# Patient Record
Sex: MALE | Race: WHITE | URBAN - METROPOLITAN AREA
[De-identification: names, ages, dates, MRNs, and addresses within clinical notes are randomized per-mention and may not be internally consistent; named-entity substitution may affect disease eponyms.]

---

## 2019-03-20 VITALS — HEIGHT: 75 IN | WEIGHT: 180 LBS | BODY MASS INDEX: 22.38 KG/M2

## 2019-03-20 DIAGNOSIS — I10 ESSENTIAL HYPERTENSION: ICD-10-CM

## 2019-03-20 DIAGNOSIS — E11.9 NON-INSULIN DEPENDENT TYPE 2 DIABETES MELLITUS (HCC): ICD-10-CM

## 2019-03-20 RX ORDER — SIMVASTATIN 20 MG
20 TABLET ORAL NIGHTLY
COMMUNITY

## 2019-03-20 RX ORDER — RAMIPRIL 10 MG/1
10 CAPSULE ORAL DAILY
COMMUNITY
End: 2022-05-17 | Stop reason: ALTCHOICE

## 2019-03-20 SDOH — HEALTH STABILITY: MENTAL HEALTH: HOW OFTEN DO YOU HAVE A DRINK CONTAINING ALCOHOL?: NEVER

## 2019-03-27 ENCOUNTER — TELEPHONE (OUTPATIENT)
Dept: GASTROENTEROLOGY | Age: 59
End: 2019-03-27

## 2019-03-27 ENCOUNTER — OFFICE VISIT (OUTPATIENT)
Dept: FAMILY MEDICINE CLINIC | Age: 59
End: 2019-03-27
Payer: COMMERCIAL

## 2019-03-27 VITALS
WEIGHT: 169.8 LBS | SYSTOLIC BLOOD PRESSURE: 134 MMHG | HEIGHT: 75 IN | DIASTOLIC BLOOD PRESSURE: 66 MMHG | BODY MASS INDEX: 21.11 KG/M2

## 2019-03-27 DIAGNOSIS — I10 ESSENTIAL HYPERTENSION: ICD-10-CM

## 2019-03-27 DIAGNOSIS — R53.83 FATIGUE, UNSPECIFIED TYPE: ICD-10-CM

## 2019-03-27 DIAGNOSIS — E11.9 TYPE 2 DIABETES MELLITUS WITHOUT COMPLICATION, UNSPECIFIED WHETHER LONG TERM INSULIN USE (HCC): ICD-10-CM

## 2019-03-27 DIAGNOSIS — Z86.19 HISTORY OF HEPATITIS: ICD-10-CM

## 2019-03-27 DIAGNOSIS — Z86.010 HISTORY OF COLON POLYPS: ICD-10-CM

## 2019-03-27 DIAGNOSIS — Z12.11 COLON CANCER SCREENING: Primary | ICD-10-CM

## 2019-03-27 DIAGNOSIS — Z00.00 ROUTINE GENERAL MEDICAL EXAMINATION AT A HEALTH CARE FACILITY: Primary | ICD-10-CM

## 2019-03-27 DIAGNOSIS — E78.5 HYPERLIPIDEMIA, UNSPECIFIED HYPERLIPIDEMIA TYPE: ICD-10-CM

## 2019-03-27 PROBLEM — Z86.0100 HISTORY OF COLON POLYPS: Status: ACTIVE | Noted: 2019-03-27

## 2019-03-27 PROCEDURE — G8484 FLU IMMUNIZE NO ADMIN: HCPCS | Performed by: FAMILY MEDICINE

## 2019-03-27 PROCEDURE — 99386 PREV VISIT NEW AGE 40-64: CPT | Performed by: FAMILY MEDICINE

## 2019-03-27 RX ORDER — CHLORHEXIDINE GLUCONATE 0.12 MG/ML
RINSE ORAL
Refills: 6 | COMMUNITY
Start: 2019-03-04

## 2019-03-27 RX ORDER — M-VIT,TX,IRON,MINS/CALC/FOLIC 27MG-0.4MG
1 TABLET ORAL DAILY
COMMUNITY

## 2019-03-27 ASSESSMENT — PATIENT HEALTH QUESTIONNAIRE - PHQ9
SUM OF ALL RESPONSES TO PHQ QUESTIONS 1-9: 0
2. FEELING DOWN, DEPRESSED OR HOPELESS: 0
SUM OF ALL RESPONSES TO PHQ9 QUESTIONS 1 & 2: 0
SUM OF ALL RESPONSES TO PHQ QUESTIONS 1-9: 0
1. LITTLE INTEREST OR PLEASURE IN DOING THINGS: 0

## 2019-03-27 NOTE — PROGRESS NOTES
tablet Take 20 mg by mouth nightly    Historical Provider, MD   ramipril (ALTACE) 10 MG capsule Take 10 mg by mouth daily    Historical Provider, MD       ROS:  General Constitutional: Denies chills. Denies fever. Denies headache. Denies lightheadedness. Admits decreased stamina over the last few months. Ophthalmologic: Denies blurred vision. ENT: Denies nasal congestion. Denies sore throat. Denies ear pain and pressure. Respiratory: Denies cough. Denies shortness of breath. Denies wheezing. Cardiovascular: Denies chest pain at rest. Denies irregular heartbeat. Denies palpitations. Gastrointestinal: Denies abdominal pain. Denies blood in the stool. Admits constipation, drinks prune juice and takes ex lax bowels move once daily and stools are type 2-3 on bristol scale. Denies diarrhea. Denies nausea. Denies vomiting. Genitourinary: Denies blood in the urine. Denies difficulty urinating. Denies frequent urination. Denies painful urination. Denies urinary incontinence. Musculoskeletal: Denies muscle aches. Denies painful joints. Denies swollen joints. Peripheral Vascular: Denies pain/cramping in legs after exertion. Skin: Denies dry skin. Denies itching. Denies rash. Neurologic: Denies falls. Denies dizziness. Denies fainting. Denies tingling/numbness. Psychiatric: Admits sleep disturbance trouble sleeping due to being a light sleeper and waking to urinate but is usually able fall back asleep quickly and wakes feeling pretty rested. . Denies anxiety. Denies depressed mood. Podiatric: Admits aching tenderness in great toes and on the balls of feet L>R gradually worsening over the last couple of months. Motrin is helpful. Past Surgical History:   Procedure Laterality Date    CHOLECYSTECTOMY, LAPAROSCOPIC  200    COLONOSCOPY  2014?     pre-cancerous polyp removed, was told to repeat in 5 years       Family History   Problem Relation Age of Onset    Cancer Mother 54        breast    High Blood Pressure Father     Emphysema Father         smoker    Cancer Sister 54        breast    Heart Disease Paternal Grandfather        Past Medical History:   Diagnosis Date    Congenital absence of gallbladder     diagnosed by [facility] in [facility] hypertension     Non-insulin dependent type 2 diabetes mellitus ([facility] [address]       Social History     Tobacco Use    Smoking status: Never Smoker    Smokeless tobacco: Never Used   Substance Use Topics    Alcohol use: Never     Frequency: Never      Current Outpatient Medications   Medication Sig Dispense Refill    chlorhexidine (PERIDEX) 0.12 % solution RINSE 15 MLS IN MOUTH TWICE DAILY FOR 30 SECONDS, THEN SPIT OUT.  6    Multiple Vitamins-Minerals (THERAPEUTIC MULTIVITAMIN-MINERALS) tablet Take 1 tablet by mouth daily      Na Sulfate-K Sulfate-Mg Sulf (SUPREP BOWEL PREP KIT) 17.5-3.13-1.6 GM/177ML SOLN Take as directed 2 Bottle 0    metFORMIN (GLUCOPHAGE) 500 MG tablet Take 500 mg by mouth 2 times daily (with meals)      simvastatin (ZOCOR) 20 MG tablet Take 20 mg by mouth nightly      ramipril (ALTACE) 10 MG capsule Take 10 mg by mouth daily       No current facility-administered medications for this visit. No Known Allergies    PHYSICAL EXAM:    /66   Ht 6' 3\" (1.905 m)   Wt 169 lb 12.8 oz (77 kg)   BMI 21.22 kg/m²   Wt Readings from Last 3 Encounters:   03/27/19 169 lb 12.8 oz (77 kg)   03/20/19 180 lb (81.6 kg)     BP Readings from Last 3 Encounters:   03/27/19 134/66       General Appearance: in no acute distress, well developed, well nourished. Eyes: pupils equal, round reactive to light and accommodation. Wearing glasses  Ears: normal canal and TM's. Nose: nares patent, no lesions. Oral Cavity: mucosa moist.  Throat: clear. Neck/Thyroid: neck supple, full range of motion, no cervical lymphadenopathy, no thyromegaly or carotid bruits. Skin: warm and dry. No suspicious lesions. Heart: regular rate and rhythm. No murmurs.  S1, S2 normal, no gallops. Lungs: clear to auscultation bilaterally. Abdomen: bowel sounds present, soft, nontender, nondistended, no masses or organomegaly. Musculoskeletal: normal, full range of motion in knees and hips, no swelling or tenderness. Extremities: no cyanosis or edema. Peripheral Pulses: 2+ throughout, symetric. Neurologic: nonfocal, motor strength normal upper and lower extremities, sensory exam intact. Psych: normal affect, speech fluent. Diabetic foot check: monofilament testing: normal, bilaterally                                       Vibratory testing: normal, bilaterally. : testicular atrophy bilaterally, no hernia. Rectal: prostate small 3 cm x 2 cm, no nodules, scant amount of stool in rectal vault. Podiatric: calcification and nodularity distal metatarsal R great toe. ASSESSMENT:   Diagnosis Orders   1. Routine general medical examination at a health care facility   DIABETES FOOT EXAM    CBC    Comprehensive Metabolic Panel    CRP,High Sensitivity    Hemoglobin A1C    Lipid Panel    T4    TSH without Reflex    Testosterone    Microalbumin, Ur    Hepatitis A Antibody, IgM    Hepatitis B Core Antibody, IgM    Hepatitis C Antibody   2. History of hepatitis     3. History of colon polyps     4. Fatigue, unspecified type     5. Type 2 diabetes mellitus without complication, unspecified whether long term insulin use ([facility] [address]     6. Essential hypertension     7. Hyperlipidemia, unspecified hyperlipidemia type         PLAN:  We discuss his hospitalization from years ago. It sounds like he had a coxsackie virus since he has not had recurrence since then. Not knowing whether this was active or certain of which type of hepatitis he had, I would like to re-test.     He lost weight after being diagnosed with diabetes and starting metformin but he states his weight has been stable over the last year. He has also had increased stress     I would like to get some labs, today, also.  I will call with these results. I will refer him for a repeat colonoscopy. I will see him back in 6 months. Orders Placed This Encounter   Procedures    CBC     Standing Status:   Future     Standing Expiration Date:   3/27/2020    Comprehensive Metabolic Panel     Standing Status:   Future     Standing Expiration Date:   3/27/2020   Andrei Bryant CRP,High Sensitivity     Standing Status:   Future     Standing Expiration Date:   3/27/2020    Hemoglobin A1C     Standing Status:   Future     Standing Expiration Date:   3/27/2020    Lipid Panel     Standing Status:   Future     Standing Expiration Date:   3/27/2020     Order Specific Question:   Is Patient Fasting?/# of Hours     Answer:   no fasting    T4     Standing Status:   Future     Standing Expiration Date:   3/27/2020    TSH without Reflex     Standing Status:   Future     Standing Expiration Date:   3/27/2020    Testosterone     Standing Status:   Future     Standing Expiration Date:   3/27/2020    Microalbumin, Ur     Standing Status:   Future     Standing Expiration Date:   3/27/2020    Hepatitis A Antibody, IgM     Standing Status:   Future     Standing Expiration Date:   3/27/2020    Hepatitis B Core Antibody, IgM     Standing Status:   Future     Standing Expiration Date:   3/27/2020    Hepatitis C Antibody     Standing Status:   Future     Standing Expiration Date:   3/27/2020    HM DIABETES FOOT EXAM     No orders of the defined types were placed in this encounter. I, Dr. Amanda Blackmon, personally performed the services described in this documentation as scribed by JESUS Lane in my presence, and is both accurate and complete.

## 2019-03-27 NOTE — TELEPHONE ENCOUNTER
Direct Endoscopy Referral       Fax to [phone] or call the [facility] at [phone]    Avery Martin  1960  [phone] There is no work phone number on file. Richard Tessie Coronado [id number]    Referring Provider: No primary care provider on file. No primary provider on file. None  Special Requests: PLEASE CALL AND SCHEDULE PATIENT    Check Requested Procedures:    [x]  Colonoscopy (Please check test type and diagnosis below)    [x]  CPT Code:  Screening Average Risk     []  CPT Code:  Screening High Risk                 []  CPT Code :  Diagnostic Colonoscopy 50098    []  Personal History of colon cancer (Date of surgery)               []  Personal History of colon polyps (Date of surgery)     []  Family history of colon cancer (Who)     []  Family history of colon polyps (1st degree relative - Who)     []  Abnormal barium enema or CT (Please attach report)    []  Change in bowel habits     []  Occult GI bleeding     []  Melena with negative EGD     []  Iron deficiency anemia    []  Other:        []  EGD (Please check test type and diagnosis below)     []  CPT Code: EGD 52666    []  Melena     []  Dyspepsia/GERD     []  Dysphagia     []  Epigastric pain unresponsive to treatment     []  Iron deficiency anemia with negative colonoscopy     []  Occult GI bleeding with negative colonoscopy     []  Abnormal UGI, x-ray, or CT (attach report)    [] Other:    HISTORY:  Past Medical History:   Diagnosis Date    Congenital absence of gallbladder     diagnosed by [facility]EL [facility]QUE[facility] in [address] [place] [address] hypertension     Non-insulin dependent type 2 diabetes mellitus ([facility] [address]      Patient has no known allergies. Prior to Visit Medications    Medication Sig Taking? Authorizing Provider   chlorhexidine (PERIDEX) 0.12 % solution RINSE 15 MLS IN MOUTH TWICE DAILY FOR 30 SECONDS, THEN SPIT OUT.   Historical Provider, MD   Multiple Vitamins-Minerals (THERAPEUTIC MULTIVITAMIN-MINERALS) tablet Take 1 tablet by mouth daily  Historical Provider, MD   metFORMIN (GLUCOPHAGE) 500 MG tablet Take 500 mg by mouth 2 times daily (with meals)  Historical Provider, MD   simvastatin (ZOCOR) 20 MG tablet Take 20 mg by mouth nightly  Historical Provider, MD   ramipril (ALTACE) 10 MG capsule Take 10 mg by mouth daily  Historical Provider, MD         Electronically signed by No primary care provider on file.  on 3/27/19 at 4:20 PM

## 2019-03-29 RX ORDER — SODIUM, POTASSIUM,MAG SULFATES 17.5-3.13G
SOLUTION, RECONSTITUTED, ORAL ORAL
Qty: 2 BOTTLE | Refills: 0 | Status: ON HOLD | OUTPATIENT
Start: 2019-03-29 | End: 2019-08-19 | Stop reason: HOSPADM

## 2019-04-02 NOTE — TELEPHONE ENCOUNTER
Procedure scheduled  8/19/19. Patient notified surgery will call the day prior to procedure. With time. Order faxed to surgery.  Prep instructions mailed to patient

## 2019-04-08 LAB
ABSOLUTE BASO #: 0.1 K/UL (ref 0–0.1)
ABSOLUTE EOS #: 0.7 K/UL (ref 0.1–0.4)
ABSOLUTE LYMPH #: 2.2 K/UL (ref 0.8–5.2)
ABSOLUTE MONO #: 0.5 K/UL (ref 0.1–0.9)
ABSOLUTE NEUT #: 4.8 K/UL (ref 1.3–9.1)
ALBUMIN SERPL-MCNC: 4.8 G/DL
ALBUMIN SERPL-MCNC: 4.8 G/DL (ref 3.2–5.3)
ALK PHOSPHATASE: 84 U/L (ref 39–130)
ALP BLD-CCNC: 84 U/L
ALT SERPL-CCNC: 10 U/L
ALT SERPL-CCNC: 10 U/L (ref 0–31)
ANION GAP SERPL CALCULATED.3IONS-SCNC: 9 MMOL/L
ANION GAP SERPL CALCULATED.3IONS-SCNC: 9 MMOL/L (ref 4–12)
ANTIBODY: NON REACTIVE
AST SERPL-CCNC: 10 U/L
AST SERPL-CCNC: 10 U/L (ref 0–41)
AVERAGE GLUCOSE: 349
BASOPHILS ABSOLUTE: 0.1 /ΜL
BASOPHILS RELATIVE PERCENT: 1 %
BASOPHILS RELATIVE PERCENT: 1 %
BILIRUB SERPL-MCNC: 1.1 MG/DL (ref 0.1–1.4)
BILIRUB SERPL-MCNC: 1.1 MG/DL (ref 0.3–1.2)
BUN BLDV-MCNC: 11 MG/DL
BUN BLDV-MCNC: 11 MG/DL (ref 5–23)
CALCIUM SERPL-MCNC: 9.8 MG/DL
CALCIUM SERPL-MCNC: 9.8 MG/DL (ref 8.5–10.5)
CHLORIDE BLD-SCNC: 100 MMOL/L
CHLORIDE BLD-SCNC: 100 MMOL/L (ref 98–109)
CHOLESTEROL, TOTAL: 222 MG/DL
CHOLESTEROL/HDL RATIO: 4.6
CHOLESTEROL/HDL RATIO: 4.6 (ref 1–5)
CHOLESTEROL: 222 MG/DL (ref 150–200)
CO2: 30 MMOL/L
CO2: 30 MMOL/L (ref 22–32)
CREAT SERPL-MCNC: 0.84 MG/DL
CREAT SERPL-MCNC: 0.84 MG/DL (ref 0.4–1)
CREATININE URINE: 113.9 MG/DL
EGFR AFRICAN AMERICAN: >60 ML/MIN/1.73SQ.M
EGFR IF NONAFRICAN AMERICAN: >60 ML/MIN/1.73SQ.M
EOSINOPHILS ABSOLUTE: 0.7 /ΜL
EOSINOPHILS RELATIVE PERCENT: 8.4 %
EOSINOPHILS RELATIVE PERCENT: 8.4 %
GFR CALCULATED: NORMAL
GLUCOSE BLD-MCNC: 327 MG/DL
GLUCOSE: 327 MG/DL (ref 65–99)
HBA1C MFR BLD: 13.8 %
HCT VFR BLD CALC: 46.8 % (ref 36–48)
HCT VFR BLD CALC: 46.8 % (ref 41–53)
HDLC SERPL-MCNC: 48 MG/DL
HDLC SERPL-MCNC: 48 MG/DL (ref 35–70)
HEMOGLOBIN: 16.4 G/DL (ref 12–16)
HEMOGLOBIN: 16.4 G/DL (ref 13.5–17.5)
HIGH SENSITIVE C-REACTIVE PROTEIN: 0.32 MG/DL (ref 0–0.74)
LDL CHOLESTEROL CALCULATED: 151 MG/DL
LDL CHOLESTEROL CALCULATED: 151 MG/DL (ref 0–160)
LDL/HDL RATIO: 3.2
LYMPHOCYTE %: 26.6 %
LYMPHOCYTES ABSOLUTE: 2.2 /ΜL
LYMPHOCYTES RELATIVE PERCENT: 26.6 %
MCH RBC QN AUTO: 29.8 PG
MCH RBC QN AUTO: 29.8 PG (ref 27–34)
MCHC RBC AUTO-ENTMCNC: 35 G/DL
MCHC RBC AUTO-ENTMCNC: 35 G/DL (ref 31–36)
MCV RBC AUTO: 85.1 FL
MCV RBC AUTO: 85.1 FL (ref 80–100)
MICROALBUMIN/CREAT 24H UR: 4.4 MG/G{CREAT}
MONOCYTES # BLD: 5.9 %
MONOCYTES ABSOLUTE: 0.5 /ΜL
MONOCYTES RELATIVE PERCENT: 5.9 %
NEUTROPHILS ABSOLUTE: 4.8 /ΜL
NEUTROPHILS RELATIVE PERCENT: 57.7 %
NEUTROPHILS RELATIVE PERCENT: 57.7 %
PDW BLD-RTO: 11.7 % (ref 10.8–14.8)
PLATELET # BLD: 208 K/ΜL
PLATELETS: 208 K/UL (ref 150–450)
PMV BLD AUTO: NORMAL FL
POTASSIUM SERPL-SCNC: 4.2 MMOL/L
POTASSIUM SERPL-SCNC: 4.2 MMOL/L (ref 3.5–5)
RBC # BLD: 5.5 10^6/ΜL
RBC: 5.5 M/UL (ref 4–5.5)
SODIUM BLD-SCNC: 139 MMOL/L
SODIUM BLD-SCNC: 139 MMOL/L (ref 134–146)
T4 TOTAL: 9.1
T4 TOTAL: 9.1 UG/DL (ref 6.1–12.2)
TESTOSTERONE TOTAL: 4.43
TESTOSTERONE TOTAL: 4.43 NG/ML (ref 0–0.7)
TOTAL PROTEIN: 7
TOTAL PROTEIN: 7 G/DL (ref 6–8)
TRIGL SERPL-MCNC: 113 MG/DL
TRIGL SERPL-MCNC: 113 MG/DL (ref 27–150)
TSH SERPL DL<=0.05 MIU/L-ACNC: 1.4 UIU/ML
TSH SERPL DL<=0.05 MIU/L-ACNC: 1.4 UIU/ML (ref 0.49–4.67)
VLDLC SERPL CALC-MCNC: 23 MG/DL
VLDLC SERPL CALC-MCNC: 23 MG/DL (ref 0–30)
WBC # BLD: 8.3 10^3/ML
WBC: 8.3 K/UL (ref 3.7–10.8)

## 2019-04-09 ENCOUNTER — TELEPHONE (OUTPATIENT)
Dept: FAMILY MEDICINE CLINIC | Age: 59
End: 2019-04-09

## 2019-04-09 DIAGNOSIS — Z00.00 ROUTINE GENERAL MEDICAL EXAMINATION AT A HEALTH CARE FACILITY: ICD-10-CM

## 2019-04-09 LAB
AVERAGE GLUCOSE: 349 MG/DL (ref 66–114)
CREATINE, URINE: 113.9 MG/DL (ref 28–217)
HAV IGM SER IA-ACNC: NORMAL
HBA1C MFR BLD: 13.8 %
HEPATITIS B CORE IGM ANTIBODY: NEGATIVE
HEPATITIS C ANTIBODY: NORMAL
MICROALBUMIN/CREAT 24H UR: 4.4 MG/DL (ref 1.2–40)
MICROALBUMIN/CREAT UR-RTO: 39 MG/G

## 2019-04-09 NOTE — TELEPHONE ENCOUNTER
LABS FROM HIS RECENT APPT    PLAN: 3/27/19  We discuss his hospitalization from years ago. It sounds like he had a coxsackie virus since he has not had recurrence since then. Not knowing whether this was active or certain of which type of hepatitis he had, I would like to re-test.      He lost weight after being diagnosed with diabetes and starting metformin but he states his weight has been stable over the last year. He has also had increased stress      I would like to get some labs, today, also. I will call with these results.      I will refer him for a repeat colonoscopy. I will see him back in 6 months.

## 2019-04-11 ENCOUNTER — TELEPHONE (OUTPATIENT)
Dept: FAMILY MEDICINE CLINIC | Age: 59
End: 2019-04-11

## 2019-04-11 DIAGNOSIS — E11.65 UNCONTROLLED TYPE 2 DIABETES MELLITUS WITH HYPERGLYCEMIA (HCC): Primary | ICD-10-CM

## 2019-04-11 DIAGNOSIS — R73.09 ELEVATED HEMOGLOBIN A1C: ICD-10-CM

## 2019-04-11 NOTE — TELEPHONE ENCOUNTER
Notes recorded by Alayna Clemente MD on 4/10/2019 at 5:06 PM EDT  Notify his dibabetes is uncontrolled and severly elevated A1c  The testosterone level is high  I would like him to see endocrinologist  See Dr Theo Mujica please

## 2019-04-26 PROBLEM — Z00.00 ROUTINE GENERAL MEDICAL EXAMINATION AT A HEALTH CARE FACILITY: Status: RESOLVED | Noted: 2019-03-27 | Resolved: 2019-04-26

## 2019-08-02 LAB
AVERAGE GLUCOSE: 148
HBA1C MFR BLD: 7 %

## 2019-08-16 PROBLEM — Z12.11 COLON CANCER SCREENING: Status: ACTIVE | Noted: 2019-08-16

## 2019-08-19 ENCOUNTER — ANESTHESIA (OUTPATIENT)
Dept: OPERATING ROOM | Age: 59
End: 2019-08-19
Payer: COMMERCIAL

## 2019-08-19 ENCOUNTER — HOSPITAL ENCOUNTER (OUTPATIENT)
Age: 59
Setting detail: OUTPATIENT SURGERY
Discharge: HOME | End: 2019-08-19
Attending: INTERNAL MEDICINE | Admitting: INTERNAL MEDICINE
Payer: COMMERCIAL

## 2019-08-19 ENCOUNTER — ANESTHESIA EVENT (OUTPATIENT)
Dept: OPERATING ROOM | Age: 59
End: 2019-08-19
Payer: COMMERCIAL

## 2019-08-19 VITALS
RESPIRATION RATE: 13 BRPM | OXYGEN SATURATION: 98 % | SYSTOLIC BLOOD PRESSURE: 77 MMHG | DIASTOLIC BLOOD PRESSURE: 46 MMHG

## 2019-08-19 VITALS
HEIGHT: 75 IN | TEMPERATURE: 98.2 F | SYSTOLIC BLOOD PRESSURE: 113 MMHG | BODY MASS INDEX: 19.89 KG/M2 | RESPIRATION RATE: 16 BRPM | WEIGHT: 160 LBS | OXYGEN SATURATION: 98 % | DIASTOLIC BLOOD PRESSURE: 64 MMHG | HEART RATE: 84 BPM

## 2019-08-19 LAB — GLUCOSE BLD-MCNC: 120 MG/DL (ref 74–100)

## 2019-08-19 PROCEDURE — 7100000011 HC PHASE II RECOVERY - ADDTL 15 MIN: Performed by: INTERNAL MEDICINE

## 2019-08-19 PROCEDURE — 45378 DIAGNOSTIC COLONOSCOPY: CPT | Performed by: INTERNAL MEDICINE

## 2019-08-19 PROCEDURE — 3609027000 HC COLONOSCOPY: Performed by: INTERNAL MEDICINE

## 2019-08-19 PROCEDURE — 6360000002 HC RX W HCPCS: Performed by: NURSE ANESTHETIST, CERTIFIED REGISTERED

## 2019-08-19 PROCEDURE — 2709999900 HC NON-CHARGEABLE SUPPLY: Performed by: INTERNAL MEDICINE

## 2019-08-19 PROCEDURE — 3700000000 HC ANESTHESIA ATTENDED CARE: Performed by: INTERNAL MEDICINE

## 2019-08-19 PROCEDURE — 82947 ASSAY GLUCOSE BLOOD QUANT: CPT

## 2019-08-19 PROCEDURE — 7100000010 HC PHASE II RECOVERY - FIRST 15 MIN: Performed by: INTERNAL MEDICINE

## 2019-08-19 PROCEDURE — 3700000001 HC ADD 15 MINUTES (ANESTHESIA): Performed by: INTERNAL MEDICINE

## 2019-08-19 PROCEDURE — 2580000003 HC RX 258: Performed by: INTERNAL MEDICINE

## 2019-08-19 RX ORDER — PROPOFOL 10 MG/ML
INJECTION, EMULSION INTRAVENOUS CONTINUOUS PRN
Status: DISCONTINUED | OUTPATIENT
Start: 2019-08-19 | End: 2019-08-19 | Stop reason: SDUPTHER

## 2019-08-19 RX ORDER — SODIUM CHLORIDE, SODIUM LACTATE, POTASSIUM CHLORIDE, CALCIUM CHLORIDE 600; 310; 30; 20 MG/100ML; MG/100ML; MG/100ML; MG/100ML
INJECTION, SOLUTION INTRAVENOUS CONTINUOUS
Status: DISCONTINUED | OUTPATIENT
Start: 2019-08-19 | End: 2019-08-19 | Stop reason: HOSPADM

## 2019-08-19 RX ORDER — PHENYLEPHRINE HYDROCHLORIDE 10 MG/ML
INJECTION INTRAVENOUS PRN
Status: DISCONTINUED | OUTPATIENT
Start: 2019-08-19 | End: 2019-08-19 | Stop reason: SDUPTHER

## 2019-08-19 RX ADMIN — PROPOFOL 150 MCG/KG/MIN: 10 INJECTION, EMULSION INTRAVENOUS at 08:07

## 2019-08-19 RX ADMIN — PHENYLEPHRINE HYDROCHLORIDE 100 MCG: 10 INJECTION INTRAVENOUS at 08:21

## 2019-08-19 RX ADMIN — SODIUM CHLORIDE, POTASSIUM CHLORIDE, SODIUM LACTATE AND CALCIUM CHLORIDE: 600; 310; 30; 20 INJECTION, SOLUTION INTRAVENOUS at 07:33

## 2019-08-19 ASSESSMENT — PAIN - FUNCTIONAL ASSESSMENT: PAIN_FUNCTIONAL_ASSESSMENT: 0-10

## 2019-08-19 ASSESSMENT — PAIN SCALES - GENERAL
PAINLEVEL_OUTOF10: 0
PAINLEVEL_OUTOF10: 0

## 2019-08-19 NOTE — PROGRESS NOTES
Discharge instructions given to patient and patient spouse; verbalizes understanding and offers no questions at this time.

## 2019-08-19 NOTE — OP NOTE
PROCEDURE NOTE    DATE OF PROCEDURE: 8/19/2019    ENDOSCOPIST: Apryl Catalan. Isiah Wells MD, [facility]    ASSISTANT: None    PREOPERATIVE DIAGNOSIS: previous adenomatous polyp  screening for colon cancer    POSTOPERATIVE DIAGNOSIS: hemorrhoids internal, Moderate in size    OPERATION: Colonoscopy --screening    ANESTHESIA: MAC     ESTIMATED BLOOD LOSS: No    COMPLICATIONS: None. SPECIMENS: were not obtained    HISTORY: The patient is a 64y.o. year old male with history of above preop diagnosis. Colonoscopy with possible biopsy or polypectomy has been recommended and I explained the risk, benefits, expected outcome, and alternatives to the procedure. Risks include but are not limited to bleeding, infection, respiratory distress, hypotension, and perforation of the colon. The patient understands and is in agreement. PROCEDURE: The patient was given monitored anesthesia care. The patient was given oxygen by nasal cannula. The colonoscope was inserted per rectum and advanced under direct vision to the cecum without difficulty. Findings:  Cecum/Ascending colon: normal    Transverse colon: normal    Descending/Sigmoid colon: abnormal: Apparent tattoo in the sigmoid colon, presumably at the site of his prior polypectomy    Rectum/Anus: examined in normal and retroflexed positions and was abnormal: Internal hemorrhoids    The colon was decompressed and the scope was removed. The patient tolerated the procedure well. Current guidelines call for a repeat colonoscopy in 5 years.       Electronically signed by Latoya Patton MD  on 8/19/2019 at 8:39 AM

## 2019-08-19 NOTE — ANESTHESIA PRE PROCEDURE
Surgical History:        Procedure Laterality Date    CHOLECYSTECTOMY, LAPAROSCOPIC  1990    COLONOSCOPY  2014? pre-cancerous polyp removed, was told to repeat in 5 years       Social History:    Social History     Tobacco Use    Smoking status: Never Smoker    Smokeless tobacco: Never Used   Substance Use Topics    Alcohol use: Never     Frequency: Never                                Counseling given: Not Answered      Vital Signs (Current):   Vitals:    08/19/19 0723   BP: (!) 144/83   Pulse: 104   Resp: 18   Temp: 37.2 °C (98.9 °F)   TempSrc: Temporal   SpO2: 99%   Weight: 160 lb (72.6 kg)   Height: 6' 3\" (1.905 m)                                              BP Readings from Last 3 Encounters:   08/19/19 (!) 144/83   03/27/19 134/66       NPO Status: Time of last liquid consumption: 0445                        Time of last solid consumption: 0900                        Date of last liquid consumption: 08/19/19                        Date of last solid food consumption: 08/18/19    BMI:   Wt Readings from Last 3 Encounters:   08/19/19 160 lb (72.6 kg)   03/27/19 169 lb 12.8 oz (77 kg)   03/20/19 180 lb (81.6 kg)     Body mass index is 20 kg/m². CBC:   Lab Results   Component Value Date    WBC 8.3 04/08/2019    WBC 8.3 04/08/2019    RBC 5.50 04/08/2019    HGB 16.4 04/08/2019    HCT 46.8 04/08/2019    MCV 85.1 04/08/2019    RDW 11.7 04/08/2019     04/08/2019     04/08/2019       CMP:   Lab Results   Component Value Date     04/08/2019    K 4.2 04/08/2019     04/08/2019    CO2 30 04/08/2019    BUN 11 04/08/2019    CREATININE 0.84 04/08/2019    GLUCOSE 327 04/08/2019    PROT 7.0 04/08/2019    CALCIUM 9.8 04/08/2019    BILITOT 1.1 04/08/2019    ALKPHOS 84 04/08/2019    ALKPHOS 84 04/08/2019    AST 10 04/08/2019    ALT 10 04/08/2019       POC Tests:   Recent Labs     08/19/19  0738   POCGLU 120*       Coags: No results found for: PROTIME, INR, APTT    HCG (If Applicable):  No results found for: PREGTESTUR, PREGSERUM, HCG, HCGQUANT     ABGs: No results found for: PHART, PO2ART, WTM4CMA, PVN1WLA, BEART, E7WZUDKP     Type & Screen (If Applicable):  No results found for: [facility]    Anesthesia Evaluation  Patient summary reviewed  Airway: Mallampati: I  TM distance: >3 FB   Neck ROM: full  Mouth opening: > = 3 FB Dental:          Pulmonary:Negative Pulmonary ROS and normal exam  breath sounds clear to auscultation                             Cardiovascular:  Exercise tolerance: good (>4 METS),   (+) hypertension:,         Rhythm: regular  Rate: normal                    Neuro/Psych:               GI/Hepatic/Renal: Neg GI/Hepatic/Renal ROS            Endo/Other:    (+) Diabetes, . Abdominal:           Vascular: negative vascular ROS. Anesthesia Plan      MAC and TIVA     ASA 2       Induction: intravenous. Anesthetic plan and risks discussed with patient.                       Gokul Juarez   8/19/2019

## 2019-09-15 PROBLEM — Z12.11 COLON CANCER SCREENING: Status: RESOLVED | Noted: 2019-08-16 | Resolved: 2019-09-15

## 2020-02-06 LAB
AVERAGE GLUCOSE: 130
HBA1C MFR BLD: 6.5 %

## 2020-08-06 LAB
AVERAGE GLUCOSE: 118
AVERAGE GLUCOSE: 137
CHOLESTEROL, TOTAL: 169 MG/DL
CHOLESTEROL/HDL RATIO: NORMAL
CREATININE, URINE: 22.88
HBA1C MFR BLD: 6.4 %
HBA1C MFR BLD: 6.4 %
HDLC SERPL-MCNC: 55 MG/DL (ref 35–70)
HDLC SERPL-MCNC: 55 MG/DL (ref 35–70)
LDL CHOLESTEROL CALCULATED: 100 MG/DL (ref 0–160)
LDL CHOLESTEROL DIRECT: 100 MG/DL
MICROALBUMIN/CREAT 24H UR: 0.7 MG/G{CREAT}
MICROALBUMIN/CREAT UR-RTO: 8.7
NONHDLC SERPL-MCNC: NORMAL MG/DL
TRIGL SERPL-MCNC: 71 MG/DL
VLDLC SERPL CALC-MCNC: 14 MG/DL

## 2020-09-10 LAB — DIABETIC RETINOPATHY: POSITIVE

## 2020-10-21 NOTE — RESULT ENCOUNTER NOTE
He either has to make an appointment in the next week with Nelson Stovall or he is discharged  His diabetes was terribly controlled before and he never came back for follow up

## 2020-10-26 ENCOUNTER — OFFICE VISIT (OUTPATIENT)
Dept: FAMILY MEDICINE CLINIC | Age: 60
End: 2020-10-26
Payer: COMMERCIAL

## 2020-10-26 VITALS
OXYGEN SATURATION: 99 % | BODY MASS INDEX: 20.51 KG/M2 | DIASTOLIC BLOOD PRESSURE: 80 MMHG | WEIGHT: 165 LBS | HEIGHT: 75 IN | SYSTOLIC BLOOD PRESSURE: 130 MMHG | HEART RATE: 89 BPM

## 2020-10-26 PROCEDURE — 99214 OFFICE O/P EST MOD 30 MIN: CPT | Performed by: NURSE PRACTITIONER

## 2020-10-26 PROCEDURE — 90471 IMMUNIZATION ADMIN: CPT | Performed by: NURSE PRACTITIONER

## 2020-10-26 PROCEDURE — 90686 IIV4 VACC NO PRSV 0.5 ML IM: CPT | Performed by: NURSE PRACTITIONER

## 2020-10-26 ASSESSMENT — ENCOUNTER SYMPTOMS
DIARRHEA: 0
ABDOMINAL PAIN: 0
SORE THROAT: 0
COUGH: 0
CONSTIPATION: 0
WHEEZING: 0
EYE PAIN: 0
RHINORRHEA: 0
SINUS PAIN: 0
SHORTNESS OF BREATH: 0
SINUS PRESSURE: 0
EYE ITCHING: 0

## 2020-10-26 ASSESSMENT — PATIENT HEALTH QUESTIONNAIRE - PHQ9
SUM OF ALL RESPONSES TO PHQ QUESTIONS 1-9: 0
SUM OF ALL RESPONSES TO PHQ QUESTIONS 1-9: 0
1. LITTLE INTEREST OR PLEASURE IN DOING THINGS: 0
SUM OF ALL RESPONSES TO PHQ QUESTIONS 1-9: 0
2. FEELING DOWN, DEPRESSED OR HOPELESS: 0
SUM OF ALL RESPONSES TO PHQ9 QUESTIONS 1 & 2: 0

## 2020-10-26 NOTE — PROGRESS NOTES
Name: Laurent Ortiz  : 1960         Chief Complaint:     Chief Complaint   Patient presents with    Follow-up     Patient comes in for follow up. History of Present Illness:      Laurent Ortiz is a 64 y.o.  male who presents with Follow-up (Patient comes in for follow up. )  He has a history of hypertension, hyperlipidemia and DM. He denies concerns today. Hypertension:   The patient is taking ramipril 10mg daily. He is not monitoring his blood pressure at home. He is not monitoring his salt intake but states that he does not salt his food. He denies chest pain, SOB, headaches, vision changes, lower extremity edema, or palpitations. Hyperlipidemia:   Patient is taking simvastatin 20mg. He denies concerns with this medication, including muscle aches. He is adhering to a low carbohydrate diet and is exercising regularly using his exercise bike daily. Diabetes Mellitus:   The patient is currently taking metformin 500mg bid. He is compliant with his medication. He is following a diabetic diet including low carbohydrate diet. For exercise, he is completing exercise bike daily. He is monitoring his blood glucose at home, occasionally. He is monitoring his blood glucose once every couple of months. Home blood glucose readings average 130-145, 2 hours post-prandial. He denies any episodes of hypoglycemia. His last eye exam was 1 month ago. He is being followed by retinologist every 6 months. His last diabetic foot exam was 2020. He is currently being followed by Dr. Rob Morales (endocrinology) every 6 months. He denies excessive hunger, excessive thirst, or increased frequency of urination. Denies fatigue, numbness/tingling in hands or feet, or major changes in weight. Denies vision changes or sores that are slow to heal. He admits losing weight via diet modification and exercise in the past in an attempt to control his diabetes. He states that he is at a steady weight currently around 160.     Past Medical History:     Past Medical History:   Diagnosis Date    Congenital absence of gallbladder     diagnosed by [facility] in [facility] hypertension     Non-insulin dependent type 2 diabetes mellitus ([facility] [address]       Reviewed all health maintenance requirements and ordered appropriate tests  Health Maintenance Due   Topic Date Due    Pneumococcal 0-64 years Vaccine (1 of 1 - PPSV23) 04/07/1968    HIV screen  04/07/1977    Hepatitis B vaccine (1 of 3 - Risk 3-dose series) 04/07/1981    DTaP/Tdap/Td vaccine (1 - Tdap) 04/07/1981    Shingles Vaccine (1 of 2) 04/07/2012    Diabetic foot exam  03/27/2020    A1C test (Diabetic or Prediabetic)  04/08/2020    Diabetic microalbuminuria test  04/08/2020    Lipid screen  04/08/2020    Potassium monitoring  04/08/2020    Creatinine monitoring  04/08/2020    Flu vaccine (1) 09/01/2020       Past Surgical History:     Past Surgical History:   Procedure Laterality Date    CHOLECYSTECTOMY, LAPAROSCOPIC  1990    COLONOSCOPY  2014? pre-cancerous polyp removed, was told to repeat in 5 years    COLONOSCOPY N/A 8/19/2019    -hemorrhoids        Medications:       Prior to Admission medications    Medication Sig Start Date End Date Taking? Authorizing Provider   chlorhexidine (PERIDEX) 0.12 % solution RINSE 15 MLS IN MOUTH TWICE DAILY FOR 30 SECONDS, THEN SPIT OUT. 3/4/19  Yes Historical Provider, MD   Multiple Vitamins-Minerals (THERAPEUTIC MULTIVITAMIN-MINERALS) tablet Take 1 tablet by mouth daily   Yes Historical Provider, MD   metFORMIN (GLUCOPHAGE) 500 MG tablet Take 500 mg by mouth 2 times daily (with meals)   Yes Historical Provider, MD   simvastatin (ZOCOR) 20 MG tablet Take 20 mg by mouth nightly   Yes Historical Provider, MD   ramipril (ALTACE) 10 MG capsule Take 10 mg by mouth daily   Yes Historical Provider, MD        Allergies:       Patient has no known allergies. Social History:     Tobacco:    reports that he has never smoked.  He has never used smokeless tobacco.  Alcohol:      reports no history of alcohol use. Drug Use:  reports no history of drug use. Family History:     Family History   Problem Relation Age of Onset    Cancer Mother 54        breast    High Blood Pressure Father     Emphysema Father         smoker    Cancer Sister 54        breast    Heart Disease Paternal Grandfather        Review of Systems:     Positive and Negative as described in HPI    Review of Systems   Constitutional: Negative for chills, fatigue, fever and unexpected weight change. HENT: Negative for congestion, rhinorrhea, sinus pressure, sinus pain and sore throat. Eyes: Negative for pain and itching. Respiratory: Negative for cough, shortness of breath and wheezing. Cardiovascular: Negative for chest pain and palpitations. Gastrointestinal: Negative for abdominal pain, constipation and diarrhea. Endocrine: Negative for polydipsia, polyphagia and polyuria. Genitourinary: Negative for difficulty urinating. Musculoskeletal: Negative for arthralgias, joint swelling and myalgias. Skin: Negative for rash. Neurological: Negative for dizziness, light-headedness and headaches. Physical Exam:   Vitals:  /80   Pulse 89   Ht 6' 3\" (1.905 m)   Wt 165 lb (74.8 kg)   SpO2 99%   BMI 20.62 kg/m²     Physical Exam  Constitutional:       General: He is not in acute distress. Appearance: Normal appearance. He is normal weight. He is not ill-appearing, toxic-appearing or diaphoretic. HENT:      Head: Normocephalic. Right Ear: Tympanic membrane, ear canal and external ear normal. There is no impacted cerumen. Left Ear: Tympanic membrane, ear canal and external ear normal. There is no impacted cerumen. Nose: Nose normal. No congestion or rhinorrhea. Mouth/Throat:      Mouth: Mucous membranes are moist.      Pharynx: No oropharyngeal exudate or posterior oropharyngeal erythema.    Eyes:      General: Component Value Date    CHOL 222 04/08/2019    CHOL 222 04/08/2019    HDL 48 04/08/2019    LABA1C 13.8 04/08/2019       Assessment/Plan:      Diagnosis Orders   1. Essential hypertension  Hemoglobin A1C    ALT    AST    Basic Metabolic Panel    CBC   2. Hyperlipidemia, unspecified hyperlipidemia type  Lipid Panel   3. Type 2 diabetes mellitus without complication, unspecified whether long term insulin use (HCC)  Hemoglobin A1C    Lipid Panel    Microalbumin / Creatinine Urine Ratio     DM:   - Reviewed labs with patient today from 8/2020. A1c 6.4%. Lipid panel WNL. Patient following low carbohydrate diet, exercising routinely, and taking metformin 500mg bid. He is being followed by Dr. Doris Levin every 6 months. Wellness:  - Declines pneumonia vaccination.   - Received flu vaccination in office today. - Recommended shingles and tetanus vaccination. He may receive these at the local health department. - He denies concerns today and is stable on current medication regimen. I will see him back in office in 6 months with labs prior to visit, or sooner if concerns arise.       Completed Refills   Requested Prescriptions      No prescriptions requested or ordered in this encounter       Orders Placed This Encounter   Procedures    Hemoglobin A1C     Standing Status:   Future     Standing Expiration Date:   10/26/2021    ALT     Standing Status:   Future     Standing Expiration Date:   10/26/2021    AST     Standing Status:   Future     Standing Expiration Date:   10/26/2021    Basic Metabolic Panel     Standing Status:   Future     Standing Expiration Date:   10/26/2021    CBC     Standing Status:   Future     Standing Expiration Date:   10/26/2021    Lipid Panel     Standing Status:   Future     Standing Expiration Date:   10/26/2021     Order Specific Question:   Is Patient Fasting?/# of Hours     Answer:   not fasting    Microalbumin / Creatinine Urine Ratio     Standing Status:   Future     Standing Expiration Date:   10/26/2021        No results found for this visit on 10/26/20. Return in about 6 months (around 4/26/2021), or if symptoms worsen or fail to improve, for 6 month f/u. Labs prior to appt. .    Electronically signed by MATIAS Llanos CNP on 10/26/20 at 8:48 AM.

## 2020-10-26 NOTE — PATIENT INSTRUCTIONS
The patient is due for shingles and tetanus vaccinations. He may receive these at the local health department.

## 2021-04-16 LAB
ABSOLUTE BASO #: 0.1 X10E9/L (ref 0–0.2)
ABSOLUTE EOS #: 1.5 X10E9/L (ref 0–0.4)
ABSOLUTE LYMPH #: 2.2 X10E9/L (ref 1–3.5)
ABSOLUTE MONO #: 0.5 X10E9/L (ref 0–0.9)
ABSOLUTE NEUT #: 3.7 X10E9/L (ref 1.5–6.6)
ALT SERPL-CCNC: 47 U/L (ref 0–40)
ANION GAP SERPL CALCULATED.3IONS-SCNC: 7 MMOL/L (ref 5–15)
AST SERPL-CCNC: 36 U/L (ref 0–41)
AVERAGE GLUCOSE: 154 MG/DL
BASOPHILS RELATIVE PERCENT: 1 %
BUN BLDV-MCNC: 30 MG/DL (ref 5–23)
CALCIUM SERPL-MCNC: 9.3 MG/DL (ref 8.5–10.5)
CHLORIDE BLD-SCNC: 103 MMOL/L (ref 98–109)
CHOLESTEROL/HDL RATIO: 2.6 (ref 1–5)
CHOLESTEROL: 142 MG/DL (ref 150–200)
CO2: 30 MMOL/L (ref 22–32)
CREAT SERPL-MCNC: 0.89 MG/DL (ref 0.6–1.3)
CREATINE, URINE: 110.05 MG/DL
EGFR AFRICAN AMERICAN: >60 ML/MIN/1.73SQ.M
EGFR IF NONAFRICAN AMERICAN: >60 ML/MIN/1.73SQ.M
EOSINOPHILS RELATIVE PERCENT: 18.5 %
GLUCOSE: 139 MG/DL (ref 65–99)
HBA1C MFR BLD: 7 % (ref 4.4–6.4)
HCT VFR BLD CALC: 42.1 % (ref 39–49)
HDLC SERPL-MCNC: 55 MG/DL
HEMOGLOBIN: 15 G/DL (ref 13–17)
LDL CHOLESTEROL CALCULATED: 76 MG/DL
LDL/HDL RATIO: 1.4
LYMPHOCYTE %: 27.6 %
MCH RBC QN AUTO: 30.2 PG (ref 27–34)
MCHC RBC AUTO-ENTMCNC: 35.7 G/DL (ref 32–36)
MCV RBC AUTO: 85 FL (ref 80–100)
MICROALBUMIN/CREAT 24H UR: <0.7 MG/DL (ref 0–1.9)
MICROALBUMIN/CREAT UR-RTO: NORMAL MG/G CREAT (ref 0–30)
MONOCYTES # BLD: 6.5 %
NEUTROPHILS RELATIVE PERCENT: 46.4 %
PDW BLD-RTO: 12.8 % (ref 11.5–15)
PLATELETS: 173 X10E9/L (ref 150–450)
PMV BLD AUTO: 8.2 FL (ref 7–12)
POTASSIUM SERPL-SCNC: 4 MMOL/L (ref 3.5–5)
RBC: 4.97 X10E12/L (ref 4.1–5.7)
SODIUM BLD-SCNC: 140 MMOL/L (ref 134–146)
TRIGL SERPL-MCNC: 53 MG/DL (ref 27–150)
VLDLC SERPL CALC-MCNC: 11 MG/DL (ref 0–30)
WBC: 8 X10E9/L (ref 4–11)

## 2021-04-26 ENCOUNTER — OFFICE VISIT (OUTPATIENT)
Dept: FAMILY MEDICINE CLINIC | Age: 61
End: 2021-04-26
Payer: COMMERCIAL

## 2021-04-26 VITALS
HEIGHT: 75 IN | WEIGHT: 172 LBS | SYSTOLIC BLOOD PRESSURE: 136 MMHG | BODY MASS INDEX: 21.39 KG/M2 | DIASTOLIC BLOOD PRESSURE: 76 MMHG

## 2021-04-26 DIAGNOSIS — R74.8 ELEVATED LIVER ENZYMES: ICD-10-CM

## 2021-04-26 DIAGNOSIS — N50.0 BILATERAL TESTICULAR ATROPHY: ICD-10-CM

## 2021-04-26 DIAGNOSIS — Z12.5 SCREENING FOR PROSTATE CANCER: ICD-10-CM

## 2021-04-26 DIAGNOSIS — E78.5 HYPERLIPIDEMIA, UNSPECIFIED HYPERLIPIDEMIA TYPE: ICD-10-CM

## 2021-04-26 DIAGNOSIS — R79.89 ELEVATED TESTOSTERONE LEVEL: ICD-10-CM

## 2021-04-26 DIAGNOSIS — E11.9 TYPE 2 DIABETES MELLITUS WITHOUT COMPLICATION, UNSPECIFIED WHETHER LONG TERM INSULIN USE (HCC): ICD-10-CM

## 2021-04-26 DIAGNOSIS — I10 ESSENTIAL HYPERTENSION: Primary | ICD-10-CM

## 2021-04-26 PROCEDURE — 3051F HG A1C>EQUAL 7.0%<8.0%: CPT | Performed by: FAMILY MEDICINE

## 2021-04-26 PROCEDURE — 1036F TOBACCO NON-USER: CPT | Performed by: FAMILY MEDICINE

## 2021-04-26 PROCEDURE — 99214 OFFICE O/P EST MOD 30 MIN: CPT | Performed by: FAMILY MEDICINE

## 2021-04-26 PROCEDURE — 2022F DILAT RTA XM EVC RTNOPTHY: CPT | Performed by: FAMILY MEDICINE

## 2021-04-26 PROCEDURE — 3017F COLORECTAL CA SCREEN DOC REV: CPT | Performed by: FAMILY MEDICINE

## 2021-04-26 PROCEDURE — G8420 CALC BMI NORM PARAMETERS: HCPCS | Performed by: FAMILY MEDICINE

## 2021-04-26 PROCEDURE — G8427 DOCREV CUR MEDS BY ELIG CLIN: HCPCS | Performed by: FAMILY MEDICINE

## 2021-04-26 SDOH — ECONOMIC STABILITY: TRANSPORTATION INSECURITY
IN THE PAST 12 MONTHS, HAS THE LACK OF TRANSPORTATION KEPT YOU FROM MEDICAL APPOINTMENTS OR FROM GETTING MEDICATIONS?: NOT ASKED

## 2021-04-26 SDOH — ECONOMIC STABILITY: FOOD INSECURITY: WITHIN THE PAST 12 MONTHS, YOU WORRIED THAT YOUR FOOD WOULD RUN OUT BEFORE YOU GOT MONEY TO BUY MORE.: NEVER TRUE

## 2021-04-26 SDOH — ECONOMIC STABILITY: FOOD INSECURITY: WITHIN THE PAST 12 MONTHS, THE FOOD YOU BOUGHT JUST DIDN'T LAST AND YOU DIDN'T HAVE MONEY TO GET MORE.: NEVER TRUE

## 2021-04-26 ASSESSMENT — PATIENT HEALTH QUESTIONNAIRE - PHQ9
1. LITTLE INTEREST OR PLEASURE IN DOING THINGS: 0
SUM OF ALL RESPONSES TO PHQ QUESTIONS 1-9: 0
SUM OF ALL RESPONSES TO PHQ9 QUESTIONS 1 & 2: 0
SUM OF ALL RESPONSES TO PHQ QUESTIONS 1-9: 0

## 2021-04-26 NOTE — PROGRESS NOTES
JESUS Herrmann, am scribing for and in the presence of Dr. Victoriano Denise. 21/9:00am/SNP    [address] [address]  [place] [id number]  Dept: [phone]    Nitin Carmona is a 63 y.o. male here for 6 Month Follow-Up, Hypertension, and Diabetes    HPI:  HYPERTENSION:  He is not exercising. He is adherent to a low-salt diet. Blood pressure is not being monitored at home.      DIABETES MELLITUS:  Medication compliance:  n/a  Diabetic diet compliance:  compliant most of the time  Current exercise: no regular exercise  Frequency of testing: weekly  Fastins  Home blood sugar records: n/a  Any episodes of hypoglycemia? no  Eye exam current (within one year): yes, 3/2021 Dr. Lora Loredo. Has been seeing the retinologist in [facility]  Diabetic foot check in the past year Yes, today. reports that he has never smoked. He has never used smokeless tobacco.    Prior to Admission medications    Medication Sig Start Date End Date Taking? Authorizing Provider   chlorhexidine (PERIDEX) 0.12 % solution RINSE 15 MLS IN MOUTH TWICE DAILY FOR 30 SECONDS, THEN SPIT OUT. 3/4/19  Yes Historical Provider, MD   Multiple Vitamins-Minerals (THERAPEUTIC MULTIVITAMIN-MINERALS) tablet Take 1 tablet by mouth daily   Yes Historical Provider, MD   metFORMIN (GLUCOPHAGE) 500 MG tablet Take 500 mg by mouth 2 times daily (with meals)   Yes Historical Provider, MD   simvastatin (ZOCOR) 20 MG tablet Take 20 mg by mouth nightly   Yes Historical Provider, MD   ramipril (ALTACE) 10 MG capsule Take 10 mg by mouth daily   Yes Historical Provider, MD     ROS:  General Constitutional: Denies chills. Denies fever. Denies headache. Denies lightheadedness. Ophthalmologic: Denies blurred vision. ENT: Denies nasal congestion. Denies sore throat. Denies ear pain and pressure. Respiratory: Denies cough. Denies shortness of breath. Denies wheezing.   Cardiovascular: Denies chest pain at rest. Denies 20 MG tablet Take 20 mg by mouth nightly      ramipril (ALTACE) 10 MG capsule Take 10 mg by mouth daily       No current facility-administered medications for this visit. No Known Allergies    PHYSICAL EXAM:    /76 (Site: Left Upper Arm, Position: Sitting, Cuff Size: Medium Adult)   Ht 6' 3\" (1.905 m)   Wt 172 lb (78 kg)   BMI 21.50 kg/m²   Wt Readings from Last 3 Encounters:   04/26/21 172 lb (78 kg)   10/26/20 165 lb (74.8 kg)   08/19/19 160 lb (72.6 kg)     BP Readings from Last 3 Encounters:   04/26/21 136/76   10/26/20 130/80   08/19/19 113/64     General Appearance: in no acute distress, well developed, well nourished. Eyes: pupils equal, round reactive to light and accommodation. Ears: normal canal and TM's. Nose: nares patent, no lesions. Oral Cavity: mucosa moist.  Throat: clear. Neck/Thyroid: neck supple, full range of motion, no cervical lymphadenopathy, no thyromegaly or carotid bruits. Skin: warm and dry. No suspicious lesions. Heart: regular rate and rhythm. No murmurs. S1, S2 normal, no gallops. Rate 80  Lungs: clear to auscultation bilaterally. Abdomen: bowel sounds present, soft, nontender, nondistended, no masses or organomegaly. Musculoskeletal: normal, full range of motion in knees and hips, no swelling or tenderness. Extremities: no cyanosis or edema. Peripheral Pulses: 2+ throughout, symetric. Neurologic: nonfocal, motor strength normal upper and lower extremities, sensory exam intact. Psych: normal affect, speech fluent. Diabetic foot check: monofilament testing: normal                                       Vibratory testing: normal     ASSESSMENT:   Diagnosis Orders   1. Essential hypertension  CBC    ALT    AST   2. Hyperlipidemia, unspecified hyperlipidemia type  Lipid Panel   3.  Type 2 diabetes mellitus without complication, unspecified whether long term insulin use (HCC)  Basic Metabolic Panel    Hemoglobin A1C     DIABETES FOOT EXAM   4. Screening for

## 2021-09-15 LAB — DIABETIC RETINOPATHY: POSITIVE

## 2021-10-21 LAB
ABSOLUTE BASO #: 0.1 X10E9/L (ref 0–0.2)
ABSOLUTE EOS #: 0.8 X10E9/L (ref 0–0.4)
ABSOLUTE LYMPH #: 2.1 X10E9/L (ref 1–3.5)
ABSOLUTE MONO #: 0.5 X10E9/L (ref 0–0.9)
ABSOLUTE NEUT #: 4.4 X10E9/L (ref 1.5–6.6)
ALT SERPL-CCNC: 27 U/L (ref 0–40)
ANION GAP SERPL CALCULATED.3IONS-SCNC: 12 MMOL/L (ref 5–15)
AST SERPL-CCNC: 25 U/L (ref 0–41)
AVERAGE GLUCOSE: 157 MG/DL
BASOPHILS RELATIVE PERCENT: 1 %
BUN BLDV-MCNC: 28 MG/DL (ref 5–23)
CALCIUM SERPL-MCNC: 9.9 MG/DL (ref 8.5–10.5)
CHLORIDE BLD-SCNC: 101 MMOL/L (ref 98–109)
CHOLESTEROL/HDL RATIO: 2.9 (ref 1–5)
CHOLESTEROL: 176 MG/DL (ref 150–200)
CO2: 28 MMOL/L (ref 22–32)
CREAT SERPL-MCNC: 0.9 MG/DL (ref 0.6–1.3)
EGFR AFRICAN AMERICAN: >60 ML/MIN/1.73SQ.M
EGFR IF NONAFRICAN AMERICAN: >60 ML/MIN/1.73SQ.M
EOSINOPHILS RELATIVE PERCENT: 10.7 %
GLUCOSE: 112 MG/DL (ref 65–99)
HBA1C MFR BLD: 7.1 % (ref 4.4–6.4)
HCT VFR BLD CALC: 43.9 % (ref 39–49)
HDLC SERPL-MCNC: 61 MG/DL
HEMOGLOBIN: 15 G/DL (ref 13–17)
LDL CHOLESTEROL CALCULATED: 101 MG/DL
LDL/HDL RATIO: 1.7
LYMPHOCYTE %: 26.1 %
MCH RBC QN AUTO: 29.3 PG (ref 27–34)
MCHC RBC AUTO-ENTMCNC: 34.2 G/DL (ref 32–36)
MCV RBC AUTO: 86 FL (ref 80–100)
MONOCYTES # BLD: 6.4 %
NEUTROPHILS RELATIVE PERCENT: 55.8 %
PDW BLD-RTO: 12.7 % (ref 11.5–15)
PLATELETS: 178 X10E9/L (ref 150–450)
PMV BLD AUTO: 7.8 FL (ref 7–12)
POTASSIUM SERPL-SCNC: 3.9 MMOL/L (ref 3.5–5)
PSA, ULTRASENSITIVE: 1.07 NG/ML (ref 0–4)
RBC: 5.13 X10E12/L (ref 4.1–5.7)
SODIUM BLD-SCNC: 141 MMOL/L (ref 134–146)
TESTOSTERONE TOTAL: 5.87 NG/ML (ref 1.68–7.46)
TRIGL SERPL-MCNC: 72 MG/DL (ref 27–150)
VLDLC SERPL CALC-MCNC: 14 MG/DL (ref 0–30)
WBC: 7.9 X10E9/L (ref 4–11)

## 2021-10-29 ENCOUNTER — OFFICE VISIT (OUTPATIENT)
Dept: FAMILY MEDICINE CLINIC | Age: 61
End: 2021-10-29
Payer: COMMERCIAL

## 2021-10-29 VITALS
WEIGHT: 167 LBS | HEIGHT: 75 IN | DIASTOLIC BLOOD PRESSURE: 74 MMHG | SYSTOLIC BLOOD PRESSURE: 138 MMHG | BODY MASS INDEX: 20.76 KG/M2

## 2021-10-29 DIAGNOSIS — Z23 PNEUMOCOCCAL VACCINE ADMINISTERED: ICD-10-CM

## 2021-10-29 DIAGNOSIS — E78.5 HYPERLIPIDEMIA, UNSPECIFIED HYPERLIPIDEMIA TYPE: ICD-10-CM

## 2021-10-29 DIAGNOSIS — I10 ESSENTIAL HYPERTENSION: ICD-10-CM

## 2021-10-29 DIAGNOSIS — E11.9 TYPE 2 DIABETES MELLITUS WITHOUT COMPLICATION, UNSPECIFIED WHETHER LONG TERM INSULIN USE (HCC): ICD-10-CM

## 2021-10-29 DIAGNOSIS — Z23 NEEDS FLU SHOT: Primary | ICD-10-CM

## 2021-10-29 PROCEDURE — 90471 IMMUNIZATION ADMIN: CPT | Performed by: FAMILY MEDICINE

## 2021-10-29 PROCEDURE — 99214 OFFICE O/P EST MOD 30 MIN: CPT | Performed by: FAMILY MEDICINE

## 2021-10-29 PROCEDURE — 90732 PPSV23 VACC 2 YRS+ SUBQ/IM: CPT | Performed by: FAMILY MEDICINE

## 2021-10-29 PROCEDURE — 90472 IMMUNIZATION ADMIN EACH ADD: CPT | Performed by: FAMILY MEDICINE

## 2021-10-29 PROCEDURE — G8482 FLU IMMUNIZE ORDER/ADMIN: HCPCS | Performed by: FAMILY MEDICINE

## 2021-10-29 PROCEDURE — 1036F TOBACCO NON-USER: CPT | Performed by: FAMILY MEDICINE

## 2021-10-29 PROCEDURE — 2022F DILAT RTA XM EVC RTNOPTHY: CPT | Performed by: FAMILY MEDICINE

## 2021-10-29 PROCEDURE — 3051F HG A1C>EQUAL 7.0%<8.0%: CPT | Performed by: FAMILY MEDICINE

## 2021-10-29 PROCEDURE — 90674 CCIIV4 VAC NO PRSV 0.5 ML IM: CPT | Performed by: FAMILY MEDICINE

## 2021-10-29 PROCEDURE — G8420 CALC BMI NORM PARAMETERS: HCPCS | Performed by: FAMILY MEDICINE

## 2021-10-29 PROCEDURE — G8427 DOCREV CUR MEDS BY ELIG CLIN: HCPCS | Performed by: FAMILY MEDICINE

## 2021-10-29 PROCEDURE — 3017F COLORECTAL CA SCREEN DOC REV: CPT | Performed by: FAMILY MEDICINE

## 2021-10-29 NOTE — PATIENT INSTRUCTIONS
PLAN:  The history listed above was reviewed today and is accurate. His consult note from Dr. Malissa León from a year ago is reviewed. At that time, he was concerned about Type 1 diabetes. Once he made dietary changes, his diabetes became better controlled. I question his previously elevated testosterone level from 2019. He had a repeat total level done and this was normal. I question if the previous elevation was a lab error. His labs are reviewed. Overall, I am pleased with these numbers. He had a colonoscopy in 2019. This will be repeated in 2024. I will get an EKG, today. I also recommend that he take an 81 mg aspirin daily to reduce his risk for vascular events. We discuss the importance of aerobic activity and challenging himself to promote physical fitness. He will get a flu shot today and a pneumococcal vaccine (due to diabetes). I will see him back in 6 months. SURVEY:    You may be receiving a survey from [employer] regarding your visit today. Please complete the survey to enable us to provide the highest quality of care to you and your family. If you cannot score us a very good on any question, please call the office to discuss how we could of made your experience a very good one. Thank you.

## 2021-10-29 NOTE — PROGRESS NOTES
I, Tiesha Davidson TONY, am scribing for and in the presence of Dr. Timothy Lee. 10/29/2021 9:36 am [address]  [address] [facility] [address] [facility]  [place]  Dept: [phone]    Sadie Schaeffer is a 63 y.o. male here for 6 Month Follow-Up, Hypertension, and Diabetes      HPI:  HYPERTENSION:  He is not exercising.   He is adherent to a low-salt diet.    Blood pressure is not being monitored at home.      DIABETES MELLITUS:  Medication compliance:  n/a  Diabetic diet compliance:  compliant most of the time  Current exercise: no regular exercise  Frequency of testing: weekly  Fastin-140  Home blood sugar records: n/a  Any episodes of hypoglycemia? no  Eye exam current (within one year): yes, 3/2021 Dr. Camilla Gómez. Has been seeing the retinologist in [facility]  Diabetic foot check in the past year Yes, 2021    reports that he has never smoked. He has never used smokeless tobacco.    Prior to Admission medications    Medication Sig Start Date End Date Taking? Authorizing Provider   chlorhexidine (PERIDEX) 0.12 % solution RINSE 15 MLS IN MOUTH TWICE DAILY FOR 30 SECONDS, THEN SPIT OUT. 3/4/19  Yes Historical Provider, MD   Multiple Vitamins-Minerals (THERAPEUTIC MULTIVITAMIN-MINERALS) tablet Take 1 tablet by mouth daily   Yes Historical Provider, MD   metFORMIN (GLUCOPHAGE) 500 MG tablet Take 500 mg by mouth 2 times daily (with meals)   Yes Historical Provider, MD   simvastatin (ZOCOR) 20 MG tablet Take 20 mg by mouth nightly   Yes Historical Provider, MD   ramipril (ALTACE) 10 MG capsule Take 10 mg by mouth daily   Yes Historical Provider, MD       ROS:  General Constitutional: Denies chills. Denies fever. Denies headache. Denies lightheadedness. Ophthalmologic: Denies blurred vision. ENT: Denies nasal congestion. Denies sore throat. Denies ear pain and pressure. Respiratory: Denies cough. Denies shortness of breath. Denies wheezing.   Cardiovascular: Denies chest pain at rest. Denies irregular heartbeat. Denies palpitations. Gastrointestinal: Denies abdominal pain. Denies blood in the stool. Denies constipation. Denies diarrhea. Denies nausea. Denies vomiting. Genitourinary: Denies blood in the urine. Denies difficulty urinating. Denies frequent urination. Denies painful urination. Denies urinary incontinence. Musculoskeletal: Denies muscle aches. Denies painful joints. Denies swollen joints. Peripheral Vascular: Denies pain/cramping in legs after exertion. Skin: Denies dry skin. Denies itching. Denies rash. Neurologic: Denies falls. Denies dizziness. Denies fainting. Denies tingling/numbness. Psychiatric: Denies sleep disturbance. Denies anxiety. Denies depressed mood. Past Surgical History:   Procedure Laterality Date    CHOLECYSTECTOMY, LAPAROSCOPIC  200    COLONOSCOPY  2014?     pre-cancerous polyp removed, was told to repeat in 5 years    COLONOSCOPY N/A 8/19/2019    -hemorrhoids       Family History   Problem Relation Age of Onset    Cancer Mother 54        breast    High Blood Pressure Father     Emphysema Father         smoker    Cancer Sister 54        breast    Heart Disease Paternal Grandfather        Past Medical History:   Diagnosis Date    Congenital absence of gallbladder     diagnosed by [facility] in [address] [place] [address] hypertension     Non-insulin dependent type 2 diabetes mellitus ([facility] [address]       Social History     Tobacco Use    Smoking status: Never Smoker    Smokeless tobacco: Never Used   Substance Use Topics    Alcohol use: Never      Current Outpatient Medications   Medication Sig Dispense Refill    chlorhexidine (PERIDEX) 0.12 % solution RINSE 15 MLS IN MOUTH TWICE DAILY FOR 30 SECONDS, THEN SPIT OUT.  6    Multiple Vitamins-Minerals (THERAPEUTIC MULTIVITAMIN-MINERALS) tablet Take 1 tablet by mouth daily      metFORMIN (GLUCOPHAGE) 500 MG tablet Take 500 mg by mouth 2 times daily (with meals)      simvastatin (ZOCOR) 20 MG tablet Take 20 mg by mouth nightly      ramipril (ALTACE) 10 MG capsule Take 10 mg by mouth daily       No current facility-administered medications for this visit. No Known Allergies    PHYSICAL EXAM:    /74   Ht 6' 3\" (1.905 m)   Wt 167 lb (75.8 kg)   BMI 20.87 kg/m²   Wt Readings from Last 3 Encounters:   10/29/21 167 lb (75.8 kg)   04/26/21 172 lb (78 kg)   10/26/20 165 lb (74.8 kg)     BP Readings from Last 3 Encounters:   10/29/21 138/74   04/26/21 136/76   10/26/20 130/80       General Appearance: in no acute distress, well developed, well nourished. Eyes: pupils equal, round reactive to light and accommodation. Ears: normal canal and TM's. Nose: nares patent, no lesions. Oral Cavity: mucosa moist.  Throat: clear. Neck/Thyroid: neck supple, full range of motion, no cervical lymphadenopathy, no thyromegaly or carotid bruits. Skin: warm and dry. No suspicious lesions. Heart: regular rate and rhythm. No murmurs. S1, S2 normal, no gallops. Rate: 80  Lungs: clear to auscultation bilaterally. Abdomen: bowel sounds present, soft, nontender, nondistended, no masses or organomegaly. Musculoskeletal: normal, full range of motion in knees and hips, no swelling or tenderness. Extremities: no cyanosis or edema. Peripheral Pulses: 2+ throughout, symetric. Neurologic: nonfocal, motor strength normal upper and lower extremities, sensory exam intact. Psych: normal affect, speech fluent. ASSESSMENT:   Diagnosis Orders   1. Needs flu shot  INFLUENZA, MDCK QUADV, 2 YRS AND OLDER, IM, PF, PREFILL SYR OR SDV, 0.5ML (FLUCELVAX QUADV, PF)    NM IMMUNIZ ADMIN,1 SINGLE/COMB VAC/TOXOID   2. Pneumococcal vaccine administered  PNEUMOVAX 23 subcutaneous/IM (Pneumococcal polysaccharide vaccine 23-valent >= 1yo)    NM IMMUNIZ,ADMIN,EACH ADDL   3.  Type 2 diabetes mellitus without complication, unspecified whether long term insulin use (HCC)  Lipid Panel    AST    ALT    Hemoglobin B4P    Basic Metabolic Panel CBC With Auto Differential    EKG 12 lead   4. Hyperlipidemia, unspecified hyperlipidemia type  Lipid Panel    AST    ALT    Hemoglobin S7M    Basic Metabolic Panel    CBC With Auto Differential   5. Essential hypertension  Lipid Panel    AST    ALT    Hemoglobin C6Z    Basic Metabolic Panel    CBC With Auto Differential       PLAN:  The history listed above was reviewed today and is accurate. His consult note from Dr. Moon Eugene from a year ago is reviewed. At that time, he was concerned about Type 1 diabetes. Once he made dietary changes, his diabetes became better controlled. I question his previously elevated testosterone level from 2019. He had a repeat total level done and this was normal. I question if the previous elevation was a lab error. His labs are reviewed. Overall, I am pleased with these numbers. He had a colonoscopy in 2019. This will be repeated in 2024. I will get an EKG, today. I also recommend that he take an 81 mg aspirin daily to reduce his risk for vascular events. We discuss the importance of aerobic activity and challenging himself to promote physical fitness. He will get a flu shot today and a pneumococcal vaccine (due to diabetes). I will see him back in 6 months.      Orders Placed This Encounter   Procedures    INFLUENZA, MDCK QUADV, 2 YRS AND OLDER, IM, PF, PREFILL SYR OR SDV, 0.5ML (FLUCELVAX QUADV, PF)    PNEUMOVAX 23 subcutaneous/IM (Pneumococcal polysaccharide vaccine 23-valent >= 1yo)    Lipid Panel     Standing Status:   Future     Standing Expiration Date:   10/29/2022     Order Specific Question:   Is Patient Fasting?/# of Hours     Answer:   none    AST     Standing Status:   Future     Standing Expiration Date:   10/29/2022    ALT     Standing Status:   Future     Standing Expiration Date:   10/29/2022    Hemoglobin A1C     Standing Status:   Future     Standing Expiration Date:   10/29/2022    Basic Metabolic Panel     Standing Status:   Future Standing Expiration Date:   10/29/2022    CBC With Auto Differential     Standing Status:   Future     Standing Expiration Date:   10/29/2022    EKG 12 lead     Standing Status:   Future     Standing Expiration Date:   12/28/2021     Order Specific Question:   Reason for Exam?     Answer: Other    CO IMMUNIZ ADMIN,1 SINGLE/COMB VAC/TOXOID    CO IMMUNIZ,ADMIN,EACH ADDL     No orders of the defined types were placed in this encounter. I, Dr. Mela Flanagan, personally performed the services described in this documentation as scribed/transcribed by JESUS Smith in my presence, and is both accurate and complete.

## 2021-11-01 DIAGNOSIS — E11.9 TYPE 2 DIABETES MELLITUS WITHOUT COMPLICATION, UNSPECIFIED WHETHER LONG TERM INSULIN USE (HCC): ICD-10-CM

## 2021-11-01 PROCEDURE — 93000 ELECTROCARDIOGRAM COMPLETE: CPT | Performed by: FAMILY MEDICINE

## 2022-05-11 LAB
ABSOLUTE BASO #: 0.1 X10E9/L (ref 0–0.2)
ABSOLUTE EOS #: 1.3 X10E9/L (ref 0–0.4)
ABSOLUTE LYMPH #: 2.1 X10E9/L (ref 1–3.5)
ABSOLUTE MONO #: 0.5 X10E9/L (ref 0–0.9)
ABSOLUTE NEUT #: 3.2 X10E9/L (ref 1.5–6.6)
ALT SERPL-CCNC: 20 U/L (ref 0–40)
ANION GAP SERPL CALCULATED.3IONS-SCNC: 10 MMOL/L (ref 5–15)
AST SERPL-CCNC: 20 U/L (ref 0–41)
AVERAGE GLUCOSE: 166 MG/DL
BASOPHILS RELATIVE PERCENT: 1 %
BUN BLDV-MCNC: 31 MG/DL (ref 5–23)
CALCIUM SERPL-MCNC: 9.5 MG/DL (ref 8.5–10.5)
CHLORIDE BLD-SCNC: 103 MMOL/L (ref 98–109)
CHOLESTEROL/HDL RATIO: 3.2 (ref 1–5)
CHOLESTEROL: 173 MG/DL (ref 150–200)
CO2: 28 MMOL/L (ref 22–32)
CREAT SERPL-MCNC: 0.83 MG/DL (ref 0.6–1.3)
EGFR AFRICAN AMERICAN: >60 ML/MIN/1.73SQ.M
EGFR IF NONAFRICAN AMERICAN: >60 ML/MIN/1.73SQ.M
EOSINOPHILS RELATIVE PERCENT: 18.1 %
GLUCOSE: 86 MG/DL (ref 65–99)
HBA1C MFR BLD: 7.4 % (ref 4.4–6.4)
HCT VFR BLD CALC: 41.4 % (ref 39–49)
HDLC SERPL-MCNC: 54 MG/DL
HEMOGLOBIN: 14.2 G/DL (ref 13–17)
LDL CHOLESTEROL CALCULATED: 106 MG/DL
LDL/HDL RATIO: 2
LYMPHOCYTE %: 29.4 %
MCH RBC QN AUTO: 28.9 PG (ref 27–34)
MCHC RBC AUTO-ENTMCNC: 34.4 G/DL (ref 32–36)
MCV RBC AUTO: 84 FL (ref 80–100)
MONOCYTES # BLD: 7.3 %
NEUTROPHILS RELATIVE PERCENT: 44.2 %
PDW BLD-RTO: 12.6 % (ref 11.5–15)
PLATELETS: 170 X10E9/L (ref 150–450)
PMV BLD AUTO: 7.9 FL (ref 7–12)
POTASSIUM SERPL-SCNC: 4.2 MMOL/L (ref 3.5–5)
RBC: 4.92 X10E12/L (ref 4.1–5.7)
SODIUM BLD-SCNC: 141 MMOL/L (ref 134–146)
TRIGL SERPL-MCNC: 63 MG/DL (ref 27–150)
VLDLC SERPL CALC-MCNC: 13 MG/DL (ref 0–30)
WBC: 7.2 X10E9/L (ref 4–11)

## 2022-05-17 ENCOUNTER — OFFICE VISIT (OUTPATIENT)
Dept: FAMILY MEDICINE CLINIC | Age: 62
End: 2022-05-17
Payer: COMMERCIAL

## 2022-05-17 ENCOUNTER — HOSPITAL ENCOUNTER (OUTPATIENT)
Age: 62
Setting detail: SPECIMEN
Discharge: HOME | End: 2022-05-17
Payer: COMMERCIAL

## 2022-05-17 VITALS
DIASTOLIC BLOOD PRESSURE: 70 MMHG | HEIGHT: 75 IN | SYSTOLIC BLOOD PRESSURE: 152 MMHG | WEIGHT: 176.8 LBS | BODY MASS INDEX: 21.98 KG/M2

## 2022-05-17 DIAGNOSIS — E78.5 HYPERLIPIDEMIA, UNSPECIFIED HYPERLIPIDEMIA TYPE: ICD-10-CM

## 2022-05-17 DIAGNOSIS — Z12.5 SCREENING FOR PROSTATE CANCER: ICD-10-CM

## 2022-05-17 DIAGNOSIS — E11.9 TYPE 2 DIABETES MELLITUS WITHOUT COMPLICATION, UNSPECIFIED WHETHER LONG TERM INSULIN USE (HCC): Primary | ICD-10-CM

## 2022-05-17 DIAGNOSIS — I10 ESSENTIAL HYPERTENSION: ICD-10-CM

## 2022-05-17 LAB
BILIRUBIN, POC: 0
BLOOD URINE, POC: NEGATIVE
CLARITY, POC: CLEAR
COLOR, POC: YELLOW
GLUCOSE URINE, POC: NEGATIVE
KETONES, POC: NEGATIVE
LEUKOCYTE EST, POC: NEGATIVE
NITRITE, POC: NEGATIVE
PH, POC: 5
PROTEIN, POC: NEGATIVE
SPECIFIC GRAVITY, POC: 1.02
UROBILINOGEN, POC: NORMAL

## 2022-05-17 PROCEDURE — 1036F TOBACCO NON-USER: CPT | Performed by: FAMILY MEDICINE

## 2022-05-17 PROCEDURE — G8427 DOCREV CUR MEDS BY ELIG CLIN: HCPCS | Performed by: FAMILY MEDICINE

## 2022-05-17 PROCEDURE — 2022F DILAT RTA XM EVC RTNOPTHY: CPT | Performed by: FAMILY MEDICINE

## 2022-05-17 PROCEDURE — 82043 UR ALBUMIN QUANTITATIVE: CPT

## 2022-05-17 PROCEDURE — 3051F HG A1C>EQUAL 7.0%<8.0%: CPT | Performed by: FAMILY MEDICINE

## 2022-05-17 PROCEDURE — 3017F COLORECTAL CA SCREEN DOC REV: CPT | Performed by: FAMILY MEDICINE

## 2022-05-17 PROCEDURE — 81003 URINALYSIS AUTO W/O SCOPE: CPT | Performed by: FAMILY MEDICINE

## 2022-05-17 PROCEDURE — 82570 ASSAY OF URINE CREATININE: CPT

## 2022-05-17 PROCEDURE — G8420 CALC BMI NORM PARAMETERS: HCPCS | Performed by: FAMILY MEDICINE

## 2022-05-17 PROCEDURE — 99214 OFFICE O/P EST MOD 30 MIN: CPT | Performed by: FAMILY MEDICINE

## 2022-05-17 RX ORDER — LISINOPRIL AND HYDROCHLOROTHIAZIDE 12.5; 1 MG/1; MG/1
1 TABLET ORAL DAILY
Qty: 90 TABLET | Refills: 1 | Status: SHIPPED | OUTPATIENT
Start: 2022-05-17

## 2022-05-17 SDOH — ECONOMIC STABILITY: FOOD INSECURITY: WITHIN THE PAST 12 MONTHS, YOU WORRIED THAT YOUR FOOD WOULD RUN OUT BEFORE YOU GOT MONEY TO BUY MORE.: NEVER TRUE

## 2022-05-17 SDOH — ECONOMIC STABILITY: FOOD INSECURITY: WITHIN THE PAST 12 MONTHS, THE FOOD YOU BOUGHT JUST DIDN'T LAST AND YOU DIDN'T HAVE MONEY TO GET MORE.: NEVER TRUE

## 2022-05-17 ASSESSMENT — PATIENT HEALTH QUESTIONNAIRE - PHQ9
SUM OF ALL RESPONSES TO PHQ QUESTIONS 1-9: 0
2. FEELING DOWN, DEPRESSED OR HOPELESS: 0
SUM OF ALL RESPONSES TO PHQ QUESTIONS 1-9: 0
SUM OF ALL RESPONSES TO PHQ9 QUESTIONS 1 & 2: 0
1. LITTLE INTEREST OR PLEASURE IN DOING THINGS: 0

## 2022-05-17 ASSESSMENT — SOCIAL DETERMINANTS OF HEALTH (SDOH): HOW HARD IS IT FOR YOU TO PAY FOR THE VERY BASICS LIKE FOOD, HOUSING, MEDICAL CARE, AND HEATING?: NOT HARD AT ALL

## 2022-05-17 NOTE — PROGRESS NOTES
Bear MARQUEZ RMA, am scribing for and in the presence of Dr. Alejandra Villa. 2022 9:01 am [address]  [address]  [place] [address]  Dept: [phone]    Elier Ha is a 63 y.o. male here for 6 Month Follow-Up, Hypertension, and Diabetes      HPI:  HYPERTENSION:  He is exercising, walking a mile daily.   He is adherent to a low-salt diet.    Blood pressure is not being monitored at home.      DIABETES MELLITUS:  Medication compliance:  n/a  Diabetic diet compliance:  compliant most of the time  Current exercise:walking a mile daily. Frequency of testing: weekly  Fastin  Any episodes of hypoglycemia? no  Eye exam current (within one year): yes, Dr. Haile Euceda. Has been seeing the retinologist, Dr. Zuleika Sherman in [facility]  Diabetic foot check in the past year Yes, today    reports that he has never smoked. He has never used smokeless tobacco.    Prior to Admission medications    Medication Sig Start Date End Date Taking? Authorizing Provider   chlorhexidine (PERIDEX) 0.12 % solution RINSE 15 MLS IN MOUTH TWICE DAILY FOR 30 SECONDS, THEN SPIT OUT. 3/4/19  Yes Historical Provider, MD   Multiple Vitamins-Minerals (THERAPEUTIC MULTIVITAMIN-MINERALS) tablet Take 1 tablet by mouth daily   Yes Historical Provider, MD   metFORMIN (GLUCOPHAGE) 500 MG tablet Take 500 mg by mouth 2 times daily (with meals)   Yes Historical Provider, MD   simvastatin (ZOCOR) 20 MG tablet Take 20 mg by mouth nightly   Yes Historical Provider, MD       ROS:  General Constitutional: Denies chills. Denies fever. Denies headache. Denies lightheadedness. Ophthalmologic: Denies blurred vision. ENT: Denies nasal congestion. Denies sore throat. Denies ear pain and pressure. Respiratory: Denies cough. Denies shortness of breath. Denies wheezing. Cardiovascular: Denies chest pain at rest. Denies irregular heartbeat. Denies palpitations. Gastrointestinal: Denies abdominal pain.  Denies blood in the stool. Denies constipation. Denies diarrhea. Denies nausea. Denies vomiting. Genitourinary: Denies blood in the urine. Denies difficulty urinating. Denies frequent urination. Denies painful urination. Denies urinary incontinence. Musculoskeletal: Denies muscle aches. Denies painful joints. Denies swollen joints. Peripheral Vascular: Denies pain/cramping in legs after exertion. Skin: Denies dry skin. Denies itching. Denies rash. Neurologic: Denies falls. Denies dizziness. Denies fainting. Denies tingling/numbness. Psychiatric: Denies sleep disturbance. Denies anxiety. Denies depressed mood. Past Surgical History:   Procedure Laterality Date    [facility]LECYST[facility]OMY, [facility]AR[facility]COPIC  [facility]    COLONOSCOPY  2014?     pre-cancerous polyp removed, was told to repeat in 5 years    COLONOSCOPY N/A 8/19/2019    -hemorrhoids       Family History   Problem Relation Age of Onset    Cancer Mother 54        breast    High Blood Pressure Father     Emphysema Father         smoker    Cancer Sister 54        breast    Heart Disease Paternal Grandfather        Past Medical History:   Diagnosis Date    Congenital absence of gallbladder     diagnosed by [facility] in [address] [place] [address] hypertension     Non-insulin dependent type 2 diabetes mellitus ([facility]     NPDR (nonproliferative diabetic retinopathy) ([facility] [address] [address] 2021    Dr Gonzalez/ Dr Silvestre Gardner History     Tobacco Use    Smoking status: Never Smoker    Smokeless tobacco: Never Used   Substance Use Topics    Alcohol use: Never      Current Outpatient Medications   Medication Sig Dispense Refill    chlorhexidine (PERIDEX) 0.12 % solution RINSE 15 MLS IN MOUTH TWICE DAILY FOR 30 SECONDS, THEN SPIT OUT.  6    Multiple Vitamins-Minerals (THERAPEUTIC MULTIVITAMIN-MINERALS) tablet Take 1 tablet by mouth daily      metFORMIN (GLUCOPHAGE) 500 MG tablet Take 500 mg by mouth 2 times daily (with meals)      simvastatin (ZOCOR) 20 MG tablet Take 20 mg by mouth nightly       No current facility-administered medications for this visit. No Known Allergies    PHYSICAL EXAM:    BP (!) 152/70   Ht 6' 3\" (1.905 m)   Wt 176 lb 12.8 oz (80.2 kg)   BMI 22.10 kg/m²   Wt Readings from Last 3 Encounters:   05/17/22 176 lb 12.8 oz (80.2 kg)   10/29/21 167 lb (75.8 kg)   04/26/21 172 lb (78 kg)     BP Readings from Last 3 Encounters:   05/17/22 (!) 152/70   10/29/21 138/74   04/26/21 136/76       General Appearance: in no acute distress, well developed, well nourished. Eyes: pupils equal, round reactive to light and accommodation. Ears: normal canal and TM's. Nose: nares patent, no lesions. Oral Cavity: mucosa moist.  Throat: clear. Neck/Thyroid: neck supple, full range of motion, no cervical lymphadenopathy, no thyromegaly or carotid bruits. Skin: warm and dry. No suspicious lesions. Heart: regular rate and rhythm. No murmurs. S1, S2 normal, no gallops. Rate: 70   Lungs: clear to auscultation bilaterally. Abdomen: bowel sounds present, soft, nontender, nondistended, no masses or organomegaly. Musculoskeletal: normal, full range of motion in knees and hips, no swelling or tenderness. Extremities: no cyanosis trace to 1+ edema right ankle 1+ edema left galo. No pretibial edema. Peripheral Pulses: 2+ throughout, symetric. Neurologic: nonfocal, motor strength normal upper and lower extremities, sensory exam intact. Psych: normal affect, speech fluent. Diabetic foot check: monofilament testing: normal bilaterally                                       Vibratory testing:normal bilaterally      ASSESSMENT:   Diagnosis Orders   1. Type 2 diabetes mellitus without complication, unspecified whether long term insulin use (HCC)  HM DIABETES FOOT EXAM    Microalbumin, Ur    CBC with Auto Differential    Basic Metabolic Panel    Hemoglobin A1C    ALT    AST    Lipid Panel    POCT Urinalysis No Micro (Auto)   2. Screening for prostate cancer  PSA Screening   3.  Hyperlipidemia, unspecified hyperlipidemia type  Microalbumin, Ur    CBC with Auto Differential    Basic Metabolic Panel    Hemoglobin A1C    ALT    AST    Lipid Panel   4. Essential hypertension  Microalbumin, Ur    CBC with Auto Differential    Basic Metabolic Panel    Hemoglobin A1C    ALT    AST    Lipid Panel       PLAN:  The history listed above was reviewed today and is accurate. He is walking a mile/day and feels good doing this. His labs are reviewed. His A1C is 7.4. This is higher than we like to see. He reports that he didn't do well with exercise over the winter. His blood pressure is a little on the high side, today. He doesn't monitor this at home. He does not add salt to his food and reports he doesn't eat much out of a can or eat fast food often. We discuss the peripheral edema, today. I explain that primary hypertension's cause is unknown for most. However, we do know that dietary sodium makes a difference on some people's blood pressure. We discuss the importance limiting dietary sodium. I will get a urinalysis today to evaluate for proteinuria. I will also send this for microalbumin. I will also add a small amount of diuretic to assist with the elevation in BP and edema. I will have him discontinue the ramipril and start lisinopril-HCTZ 10-12.5 mg once daily. I will see him back in 6 months.    Orders Placed This Encounter   Procedures    Microalbumin, Ur    CBC with Auto Differential     Standing Status:   Future     Standing Expiration Date:   5/17/2023    Basic Metabolic Panel     Standing Status:   Future     Standing Expiration Date:   5/17/2023    Hemoglobin A1C     Standing Status:   Future     Standing Expiration Date:   5/17/2023    ALT     Standing Status:   Future     Standing Expiration Date:   5/17/2023    AST     Standing Status:   Future     Standing Expiration Date:   5/17/2023    Lipid Panel     Standing Status:   Future     Standing Expiration Date:   5/17/2023     Order Specific Question:   Is Patient Fasting?/# of Hours     Answer:   0    PSA Screening     Standing Status:   Future     Standing Expiration Date:   5/17/2023    POCT Urinalysis No Micro (Auto)    HM DIABETES FOOT EXAM     No orders of the defined types were placed in this encounter. I, Dr. Jordi Rosa, personally performed the services described in this documentation as scribed/transcribed by JESUS Trejo in my presence, and is both accurate and complete.

## 2022-05-20 LAB
CREATININE URINE: 127.7 MG/DL (ref 39–259)
MICROALBUMIN/CREAT 24H UR: <12 MG/L
MICROALBUMIN/CREAT UR-RTO: NORMAL MCG/MG CREAT

## 2022-05-23 ENCOUNTER — TELEPHONE (OUTPATIENT)
Dept: FAMILY MEDICINE CLINIC | Age: 62
End: 2022-05-23

## 2022-11-15 ENCOUNTER — OFFICE VISIT (OUTPATIENT)
Dept: PRIMARY CARE CLINIC | Age: 62
End: 2022-11-15
Payer: COMMERCIAL

## 2022-11-15 VITALS
WEIGHT: 176 LBS | SYSTOLIC BLOOD PRESSURE: 136 MMHG | BODY MASS INDEX: 21.88 KG/M2 | DIASTOLIC BLOOD PRESSURE: 80 MMHG | RESPIRATION RATE: 14 BRPM | TEMPERATURE: 96.8 F | OXYGEN SATURATION: 99 % | HEIGHT: 75 IN | HEART RATE: 92 BPM

## 2022-11-15 DIAGNOSIS — E11.9 TYPE 2 DIABETES MELLITUS WITHOUT COMPLICATION, UNSPECIFIED WHETHER LONG TERM INSULIN USE (HCC): ICD-10-CM

## 2022-11-15 DIAGNOSIS — E78.5 HYPERLIPIDEMIA, UNSPECIFIED HYPERLIPIDEMIA TYPE: ICD-10-CM

## 2022-11-15 DIAGNOSIS — I10 ESSENTIAL HYPERTENSION: Primary | ICD-10-CM

## 2022-11-15 PROCEDURE — 99214 OFFICE O/P EST MOD 30 MIN: CPT | Performed by: NURSE PRACTITIONER

## 2022-11-15 PROCEDURE — G8427 DOCREV CUR MEDS BY ELIG CLIN: HCPCS | Performed by: NURSE PRACTITIONER

## 2022-11-15 PROCEDURE — 3074F SYST BP LT 130 MM HG: CPT | Performed by: NURSE PRACTITIONER

## 2022-11-15 PROCEDURE — 90471 IMMUNIZATION ADMIN: CPT | Performed by: NURSE PRACTITIONER

## 2022-11-15 PROCEDURE — G8482 FLU IMMUNIZE ORDER/ADMIN: HCPCS | Performed by: NURSE PRACTITIONER

## 2022-11-15 PROCEDURE — 1036F TOBACCO NON-USER: CPT | Performed by: NURSE PRACTITIONER

## 2022-11-15 PROCEDURE — G8420 CALC BMI NORM PARAMETERS: HCPCS | Performed by: NURSE PRACTITIONER

## 2022-11-15 PROCEDURE — 3052F HG A1C>EQUAL 8.0%<EQUAL 9.0%: CPT | Performed by: NURSE PRACTITIONER

## 2022-11-15 PROCEDURE — 3017F COLORECTAL CA SCREEN DOC REV: CPT | Performed by: NURSE PRACTITIONER

## 2022-11-15 PROCEDURE — 90674 CCIIV4 VAC NO PRSV 0.5 ML IM: CPT | Performed by: NURSE PRACTITIONER

## 2022-11-15 PROCEDURE — 3078F DIAST BP <80 MM HG: CPT | Performed by: NURSE PRACTITIONER

## 2022-11-15 PROCEDURE — 2022F DILAT RTA XM EVC RTNOPTHY: CPT | Performed by: NURSE PRACTITIONER

## 2022-11-15 RX ORDER — LISINOPRIL AND HYDROCHLOROTHIAZIDE 12.5; 1 MG/1; MG/1
1 TABLET ORAL DAILY
Qty: 90 TABLET | Refills: 3 | Status: SHIPPED | OUTPATIENT
Start: 2022-11-15

## 2022-11-15 RX ORDER — SIMVASTATIN 20 MG
20 TABLET ORAL NIGHTLY
Qty: 90 TABLET | Refills: 3 | Status: SHIPPED | OUTPATIENT
Start: 2022-11-15

## 2022-11-15 ASSESSMENT — ENCOUNTER SYMPTOMS: SHORTNESS OF BREATH: 0

## 2022-11-15 NOTE — PROGRESS NOTES
Name: Lilly Leyva  : 1960         Chief Complaint:     Chief Complaint   Patient presents with    Hypertension     He is exercising, walking a mile daily. He is adherent to a low-salt diet. Blood pressure is not being monitored at home    Diabetes     Medication compliance:  yes  Diabetic diet compliance:  compliant most of the time  Current exercise:walking a mile daily. Frequency of testing: weekly  Fastin/170  Any episodes of hypoglycemia? no  Eye exam current (within one year): yes, Dr. Rickey Forbes. Has been seeing the retinologist, Dr. Eleonora Nevarez in [facility]  Diabetic foot check in the past year Yes,    reports that he has never smoked. He has never used smokeless tobacco.       History of Present Illness:      Lilly Leyva is a 63 y.o.  male who presents with Hypertension (He is exercising, walking a mile daily. Brennan Felipe is adherent to a low-salt diet.  /Blood pressure is not being monitored at home) and Diabetes (Medication compliance:  yes/Diabetic diet compliance:  compliant most of the time/Current exercise:walking a mile daily./Frequency of testing: weekly/Fastin/170/Any episodes of hypoglycemia? no/Eye exam current (within one year): yes, Dr. Rickey Forbes. Has been seeing the retinologist, Dr. Eleonora Nevarez in [place]/Diabetic foot check in the past year Yes, / reports that he has never smoked. He has never used smokeless tobacco.)      HPI    Hypertension:  Current medication regimen includes lisinopril-hydrochlorothiazide 10-12.5 mg daily. He is not monitoring his blood pressure at home. At-home blood pressure is averaging N/A. He admits following a low-sodium diet. He denies chest pain, shortness of breath, headache, palpitations, lightheadedness, or dizziness.      DM:  Diabetic diet/low carb diet compliance: He states he \"let it slip a little this past 6 months\"  Current exercise: walking 1 mile daily   Frequency of exercise: 7 times per week  Frequency of glucose testing at home: weekly  Home blood sugar records: 150-170 fasting  Glucometer at home: no  History of hypoglycemic episodes: no  Last eye exam: 11/2022, Dr. Rain Martinez. Seeing specialist for retinopathy, Dr. Latia Sifuentes  Last diabetic foot check: 5/2022   reports that he has never smoked. He has never used smokeless tobacco.   Medication compliance:  compliant all of the time  Medication Therapy: metformin 500mg BID  Hemoglobin A1C (%)   Date Value   11/10/2022 8.4 (H)   05/11/2022 7.4 (H)   10/21/2021 7.1 (H)   04/16/2021 7.0 (H)   08/06/2020 6.4   08/06/2020 6.4      Hyperlipidemia:  Current treatment includes simvastatin 20mg QD. He admits daily medication compliance. Denies side effects with this medication. Past Medical History:     Past Medical History:   Diagnosis Date    Congenital absence of gallbladder     diagnosed by [facility] in [address] [place] [address]. hypertension     Non-insulin dependent type 2 diabetes mellitus ([facility] [address]     NPDR (nonproliferative diabetic retinopathy) [facility] 2021    Dr Gonzalez/ Dr Rani Martinez      Reviewed all health maintenance requirements and ordered appropriate tests  Health Maintenance Due   Topic Date Due    HIV screen  Never done    DTaP/Tdap/Td vaccine (1 - Tdap) Never done    Shingles vaccine (1 of 2) Never done    COVID-19 Vaccine (4 - Booster for Orie Huguenin series) 02/04/2022    Flu vaccine (1) 08/01/2022       Past Surgical History:     Past Surgical History:   Procedure Laterality Date    CHOLECYSTECTOMY, LAPAROSCOPIC  1990    COLONOSCOPY  2014? pre-cancerous polyp removed, was told to repeat in 5 years    COLONOSCOPY N/A 8/19/2019    -hemorrhoids        Medications:       Prior to Admission medications    Medication Sig Start Date End Date Taking?  Authorizing Provider   simvastatin (ZOCOR) 20 MG tablet Take 1 tablet by mouth nightly 11/15/22  Yes MATIAS Olivera - MARLENY   lisinopril-hydroCHLOROthiazide (PRINZIDE;ZESTORETIC) 10-12.5 MG per tablet Take 1 tablet by mouth daily 11/15/22  Yes Shirley Hemphill MATIAS May CNP   metFORMIN (GLUCOPHAGE) 500 MG tablet Take 500mg (1 tablet) by mouth once daily with breakfast and 1000 mg (2 tablets) by mouth once daily with dinner 11/15/22  Yes MATIAS Jackson CNP   chlorhexidine (PERIDEX) 0.12 % solution RINSE 15 MLS IN MOUTH TWICE DAILY FOR 30 SECONDS, THEN SPIT OUT. 3/4/19  Yes Historical Provider, MD   Multiple Vitamins-Minerals (THERAPEUTIC MULTIVITAMIN-MINERALS) tablet Take 1 tablet by mouth daily   Yes Historical Provider, MD        Allergies:       Patient has no known allergies. Social History:     Tobacco:    reports that he has never smoked. He has never used smokeless tobacco.  Alcohol:      reports no history of alcohol use. Drug Use:  reports no history of drug use. Family History:     Family History   Problem Relation Age of Onset    Cancer Mother 54        breast    High Blood Pressure Father     Emphysema Father         smoker    Cancer Sister 54        breast    Heart Disease Paternal Grandfather        Review of Systems:     Positive and Negative as described in HPI    Review of Systems   Respiratory:  Negative for shortness of breath. Cardiovascular:  Negative for chest pain and palpitations. Neurological:  Negative for dizziness, light-headedness and headaches. Physical Exam:   Vitals:  /80   Pulse 92   Temp 96.8 °F (36 °C)   Resp 14   Ht 6' 3\" (1.905 m)   Wt 176 lb (79.8 kg)   SpO2 99%   BMI 22.00 kg/m²     Physical Exam  Constitutional:       General: He is not in acute distress. Appearance: Normal appearance. He is obese. He is not ill-appearing or toxic-appearing. HENT:      Head: Normocephalic. Right Ear: Tympanic membrane, ear canal and external ear normal. There is no impacted cerumen. Left Ear: Tympanic membrane, ear canal and external ear normal. There is no impacted cerumen. Nose: Nose normal. No congestion or rhinorrhea.       Mouth/Throat:      Mouth: Mucous membranes are moist. Pharynx: No oropharyngeal exudate or posterior oropharyngeal erythema. Cardiovascular:      Rate and Rhythm: Normal rate and regular rhythm. Heart sounds: Normal heart sounds. No murmur heard. Pulmonary:      Effort: Pulmonary effort is normal. No respiratory distress. Breath sounds: Normal breath sounds. No stridor. No wheezing, rhonchi or rales. Lymphadenopathy:      Cervical: No cervical adenopathy. Neurological:      Mental Status: He is alert. Psychiatric:         Mood and Affect: Mood normal.         Behavior: Behavior normal.         Thought Content: Thought content normal.         Judgment: Judgment normal.       Data:     Lab Results   Component Value Date/Time     11/10/2022 07:35 AM    K 4.0 11/10/2022 07:35 AM    CL 99 11/10/2022 07:35 AM    CO2 29 11/10/2022 07:35 AM    BUN 30 11/10/2022 07:35 AM    CREATININE 0.92 11/10/2022 07:35 AM    GLUCOSE 136 11/10/2022 07:35 AM    PROT 7.0 04/08/2019 07:41 AM    LABALBU 4.8 04/08/2019 07:41 AM    BILITOT 1.1 04/08/2019 07:41 AM    ALKPHOS 84 04/08/2019 07:41 AM    ALKPHOS 84 04/08/2019 12:00 AM    AST 23 11/10/2022 07:35 AM    ALT 19 11/10/2022 07:35 AM     Lab Results   Component Value Date/Time    WBC 7.8 11/10/2022 07:35 AM    WBC 8.3 04/08/2019 12:00 AM    RBC 5.06 11/10/2022 07:35 AM    HGB 14.4 11/10/2022 07:35 AM    HCT 41.7 11/10/2022 07:35 AM    MCV 82.4 11/10/2022 07:35 AM    MCH 28.5 11/10/2022 07:35 AM    MCHC 34.5 11/10/2022 07:35 AM    RDW 11.8 11/10/2022 07:35 AM     11/10/2022 07:35 AM     04/08/2019 12:00 AM    MPV 9.7 11/10/2022 07:35 AM     Lab Results   Component Value Date/Time    TSH 1.40 04/08/2019 07:41 AM     Lab Results   Component Value Date/Time    CHOL 196 11/10/2022 07:35 AM    CHOL 169 08/06/2020 12:00 AM    HDL 58 11/10/2022 07:35 AM    LABA1C 8.4 11/10/2022 07:35 AM       Assessment/Plan:      Diagnosis Orders   1. Essential hypertension  Comprehensive Metabolic Panel    CBC      2.  Type 2 diabetes mellitus without complication, unspecified whether long term insulin use (HCC)  Hemoglobin A1C    Microalbumin, Ur      3. Hyperlipidemia, unspecified hyperlipidemia type  Lipid Panel          Wellness:   - Flu vaccine administered today in office   - Declines tetanus vaccine   - Reviewed routine labs 11/2022, PSA WNL    Hypertension:  - Stable  - Continue lisinopril-hydrochlorothiazide 10-12.5 mg daily  - Reviewed lifestyle modifications to assist with lowering blood pressure including weight loss, healthy diet, exercising routinely, low-sodium diet, limiting caffeine and alcohol, and encouraging stress relief techniques. Hyperlipidemia:  - Reviewed lipid panel 11/2022 with mildly elevated LDL  - Continue simvastatin 20 mg daily  - Counseled on routine exercise and low-cholesterol diet    DM:  - Reviewed lab work 11/2022 including A1c 8.4%. This has increased.  See HPI for trending A1c results  - Counseled at length regarding diabetic diet and routine physical activity  - Offered referral to dietitian and/or diabetes education, patient declines  - Increase metformin to 500 mg with breakfast and 1000 mg with dinner  - Reviewed signs and symptoms of hypoglycemia   - Repeat A1c six months      Completed Refills   Requested Prescriptions     Signed Prescriptions Disp Refills    simvastatin (ZOCOR) 20 MG tablet 90 tablet 3     Sig: Take 1 tablet by mouth nightly    lisinopril-hydroCHLOROthiazide (PRINZIDE;ZESTORETIC) 10-12.5 MG per tablet 90 tablet 3     Sig: Take 1 tablet by mouth daily    metFORMIN (GLUCOPHAGE) 500 MG tablet 270 tablet 3     Sig: Take 500mg (1 tablet) by mouth once daily with breakfast and 1000 mg (2 tablets) by mouth once daily with dinner       Orders Placed This Encounter   Procedures    Influenza, FLUCELVAX, (age 10 mo+), IM, Preservative Free, 0.5 mL    Comprehensive Metabolic Panel     Standing Status:   Future     Standing Expiration Date:   11/15/2023    CBC     Standing Status: Future     Standing Expiration Date:   11/15/2023    Hemoglobin A1C     Standing Status:   Future     Standing Expiration Date:   11/15/2023    Lipid Panel     Standing Status:   Future     Standing Expiration Date:   11/15/2023    Microalbumin, Ur     Standing Status:   Future     Standing Expiration Date:   11/15/2023        No results found for this visit on 11/15/22. Return in about 6 months (around 5/15/2023), or if symptoms worsen or fail to improve, for wellness, DM f/u.     Electronically signed by MATIAS Patel CNP on 11/15/22 at 8:44 AM.

## 2023-05-23 LAB
ALBUMIN SERPL-MCNC: 5.2 G/DL (ref 3.5–5.2)
ALK PHOSPHATASE: 58 U/L (ref 40–123)
ALT SERPL-CCNC: 22 U/L (ref 5–50)
ANION GAP SERPL CALCULATED.3IONS-SCNC: 9 MEQ/L (ref 7–16)
AST SERPL-CCNC: 23 U/L (ref 9–50)
AVERAGE GLUCOSE: 157 MG/DL
BILIRUB SERPL-MCNC: 0.7 MG/DL
BUN BLDV-MCNC: 22 MG/DL (ref 8–23)
CALCIUM SERPL-MCNC: 10.3 MG/DL (ref 8.5–10.5)
CHLORIDE BLD-SCNC: 100 MEQ/L (ref 95–107)
CHOLESTEROL/HDL RATIO: 2.8 RATIO
CHOLESTEROL: 178 MG/DL
CO2: 31 MEQ/L (ref 19–31)
CREAT SERPL-MCNC: 0.92 MG/DL (ref 0.8–1.4)
EGFR IF NONAFRICAN AMERICAN: 95 ML/MIN/1.73
GLUCOSE: 131 MG/DL (ref 70–99)
HBA1C MFR BLD: 7.1 % (ref 4.2–5.6)
HCT VFR BLD CALC: 41.8 % (ref 40–51)
HDLC SERPL-MCNC: 63 MG/DL
HEMOGLOBIN: 14.3 G/DL (ref 13.5–17)
LDL CHOLESTEROL CALCULATED: 100 MG/DL
LDL/HDL RATIO: 1.6 RATIO
MCH RBC QN AUTO: 28.7 PG (ref 25–33)
MCHC RBC AUTO-ENTMCNC: 34.2 G/DL (ref 31–36)
MCV RBC AUTO: 83.9 FL (ref 80–99)
MICROALBUMIN/CREAT 24H UR: <1.2 MG/DL
PDW BLD-RTO: 12.4 % (ref 11.5–15)
PLATELETS: 184 K/UL (ref 130–400)
PMV BLD AUTO: 10 FL (ref 9.3–13)
POTASSIUM SERPL-SCNC: 4.1 MEQ/L (ref 3.5–5.4)
RBC: 4.98 M/UL (ref 4.5–6.1)
SODIUM BLD-SCNC: 140 MEQ/L (ref 133–146)
TOTAL PROTEIN: 7.4 G/DL (ref 6.1–8.3)
TRIGL SERPL-MCNC: 75 MG/DL
VLDLC SERPL CALC-MCNC: 15 MG/DL
WBC: 6.8 K/UL (ref 3.5–11)

## 2023-05-30 ENCOUNTER — OFFICE VISIT (OUTPATIENT)
Dept: PRIMARY CARE CLINIC | Age: 63
End: 2023-05-30
Payer: COMMERCIAL

## 2023-05-30 VITALS
HEART RATE: 69 BPM | RESPIRATION RATE: 16 BRPM | OXYGEN SATURATION: 100 % | WEIGHT: 170.4 LBS | SYSTOLIC BLOOD PRESSURE: 124 MMHG | DIASTOLIC BLOOD PRESSURE: 72 MMHG | BODY MASS INDEX: 21.19 KG/M2 | TEMPERATURE: 98.7 F | HEIGHT: 75 IN

## 2023-05-30 DIAGNOSIS — E78.5 HYPERLIPIDEMIA, UNSPECIFIED HYPERLIPIDEMIA TYPE: ICD-10-CM

## 2023-05-30 DIAGNOSIS — Z23 NEED FOR TETANUS BOOSTER: ICD-10-CM

## 2023-05-30 DIAGNOSIS — Z12.5 PROSTATE CANCER SCREENING: ICD-10-CM

## 2023-05-30 DIAGNOSIS — Z00.00 WELLNESS EXAMINATION: Primary | ICD-10-CM

## 2023-05-30 DIAGNOSIS — Z11.4 ENCOUNTER FOR SCREENING FOR HIV: ICD-10-CM

## 2023-05-30 DIAGNOSIS — E11.9 TYPE 2 DIABETES MELLITUS WITHOUT COMPLICATION, UNSPECIFIED WHETHER LONG TERM INSULIN USE (HCC): ICD-10-CM

## 2023-05-30 DIAGNOSIS — I10 ESSENTIAL HYPERTENSION: ICD-10-CM

## 2023-05-30 PROCEDURE — 3017F COLORECTAL CA SCREEN DOC REV: CPT | Performed by: NURSE PRACTITIONER

## 2023-05-30 PROCEDURE — 90715 TDAP VACCINE 7 YRS/> IM: CPT | Performed by: NURSE PRACTITIONER

## 2023-05-30 PROCEDURE — 2022F DILAT RTA XM EVC RTNOPTHY: CPT | Performed by: NURSE PRACTITIONER

## 2023-05-30 PROCEDURE — 3078F DIAST BP <80 MM HG: CPT | Performed by: NURSE PRACTITIONER

## 2023-05-30 PROCEDURE — 3074F SYST BP LT 130 MM HG: CPT | Performed by: NURSE PRACTITIONER

## 2023-05-30 PROCEDURE — 99213 OFFICE O/P EST LOW 20 MIN: CPT | Performed by: NURSE PRACTITIONER

## 2023-05-30 PROCEDURE — G8427 DOCREV CUR MEDS BY ELIG CLIN: HCPCS | Performed by: NURSE PRACTITIONER

## 2023-05-30 PROCEDURE — 1036F TOBACCO NON-USER: CPT | Performed by: NURSE PRACTITIONER

## 2023-05-30 PROCEDURE — 90471 IMMUNIZATION ADMIN: CPT | Performed by: NURSE PRACTITIONER

## 2023-05-30 PROCEDURE — 99396 PREV VISIT EST AGE 40-64: CPT | Performed by: NURSE PRACTITIONER

## 2023-05-30 PROCEDURE — G8420 CALC BMI NORM PARAMETERS: HCPCS | Performed by: NURSE PRACTITIONER

## 2023-05-30 SDOH — ECONOMIC STABILITY: FOOD INSECURITY: WITHIN THE PAST 12 MONTHS, YOU WORRIED THAT YOUR FOOD WOULD RUN OUT BEFORE YOU GOT MONEY TO BUY MORE.: NEVER TRUE

## 2023-05-30 SDOH — ECONOMIC STABILITY: FOOD INSECURITY: WITHIN THE PAST 12 MONTHS, THE FOOD YOU BOUGHT JUST DIDN'T LAST AND YOU DIDN'T HAVE MONEY TO GET MORE.: NEVER TRUE

## 2023-05-30 SDOH — ECONOMIC STABILITY: INCOME INSECURITY: HOW HARD IS IT FOR YOU TO PAY FOR THE VERY BASICS LIKE FOOD, HOUSING, MEDICAL CARE, AND HEATING?: NOT HARD AT ALL

## 2023-05-30 SDOH — ECONOMIC STABILITY: HOUSING INSECURITY
IN THE LAST 12 MONTHS, WAS THERE A TIME WHEN YOU DID NOT HAVE A STEADY PLACE TO SLEEP OR SLEPT IN A SHELTER (INCLUDING NOW)?: NO

## 2023-05-30 ASSESSMENT — PATIENT HEALTH QUESTIONNAIRE - PHQ9
SUM OF ALL RESPONSES TO PHQ QUESTIONS 1-9: 0
SUM OF ALL RESPONSES TO PHQ9 QUESTIONS 1 & 2: 0
SUM OF ALL RESPONSES TO PHQ QUESTIONS 1-9: 0
1. LITTLE INTEREST OR PLEASURE IN DOING THINGS: 0
SUM OF ALL RESPONSES TO PHQ QUESTIONS 1-9: 0
2. FEELING DOWN, DEPRESSED OR HOPELESS: 0
SUM OF ALL RESPONSES TO PHQ QUESTIONS 1-9: 0

## 2023-05-30 ASSESSMENT — ENCOUNTER SYMPTOMS
SHORTNESS OF BREATH: 0
SINUS PRESSURE: 0
DIARRHEA: 0
RHINORRHEA: 0
CONSTIPATION: 0
SINUS PAIN: 0
COUGH: 0
ABDOMINAL PAIN: 0
SORE THROAT: 0
WHEEZING: 0

## 2023-05-30 NOTE — PROGRESS NOTES
Component Value Date/Time    TSH 1.40 04/08/2019 07:41 AM     Lab Results   Component Value Date/Time    CHOL 178 05/23/2023 07:40 AM    CHOL 169 08/06/2020 12:00 AM    HDL 63 05/23/2023 07:40 AM    LABA1C 7.1 05/23/2023 07:40 AM       Assessment/Plan:      Diagnosis Orders   1. Wellness examination  Lipid Panel    Hemoglobin A1C    Comprehensive Metabolic Panel    CBC    PSA Screening    HIV Screen      2. Type 2 diabetes mellitus without complication, unspecified whether long term insulin use (HCC)  Hemoglobin A1C    HM DIABETES FOOT EXAM      3. Essential hypertension  Comprehensive Metabolic Panel    CBC      4. Hyperlipidemia, unspecified hyperlipidemia type  Lipid Panel      5. Encounter for screening for HIV  HIV Screen      6. Prostate cancer screening  PSA Screening      7. Need for tetanus booster  VIANNEY Malin, (age 10y-63y), IM          Wellness:  - Counseled on well-balanced diet and routine physical activity  - Encouraged routine eye and dental exams  - UTD COVID-vaccine  - UTD pneumococcal 23  - Encouraged shingles vaccine  - Tetanus vaccine administered today in office  - Reviewed lab work 5/2023, mostly unremarkable  - UTD colonoscopy, due for repeat 2024    DM:  - Reviewed A1c 5/2023 7.1%  - Increase metformin to 1000 mg twice daily  - Foot exam performed today, mostly WNL  - Mild tinea pedis noted to left ankle/foot.   Encourage OTC antifungal cream.  Notify office if symptoms worsen or persist    -- Reviewed emergent signs and symptoms and when to seek care at the emergency department and/or call 911     Completed Refills   Requested Prescriptions     Signed Prescriptions Disp Refills    metFORMIN (GLUCOPHAGE) 500 MG tablet 270 tablet 3     Sig: Take 2 tablets by mouth 2 times daily (with meals)       Orders Placed This Encounter   Procedures    VIANNEY Malin, (age 10y-63y), IM    Lipid Panel     Standing Status:   Future     Standing Expiration Date:   5/29/2024    Hemoglobin A1C     Standing

## 2023-05-30 NOTE — PATIENT INSTRUCTIONS
Increase metformin to 1000mg (2 tablets) by mouth twice daily with meals    SURVEY:    You may be receiving a survey from [employer] regarding your visit today. Please complete the survey to enable us to provide the highest quality of care to you and your family. If you cannot score us a very good on any question, please call the office to discuss how we could have made your experience a very good one. Thank you.

## 2023-09-06 ENCOUNTER — TELEPHONE (OUTPATIENT)
Dept: PRIMARY CARE CLINIC | Age: 63
End: 2023-09-06

## 2023-09-06 DIAGNOSIS — R21 RASH: Primary | ICD-10-CM

## 2023-09-06 NOTE — TELEPHONE ENCOUNTER
----- Message from Kenny Patricio sent at 9/6/2023 10:30 AM EDT -----  Subject: Referral Request    Reason for referral request? dermatology  Provider patient wants to be referred to(if known):     Provider Phone Number(if known):     Additional Information for Provider? was seen in may and noticed a rash on   ankle and rash hasn't gotten better  ---------------------------------------------------------------------------  --------------  Bobbi Brewster Avelino    [phone]; Do not leave any message, patient will call back for answer  ---------------------------------------------------------------------------  --------------

## 2023-11-22 LAB
ALBUMIN SERPL-MCNC: 5.3 G/DL (ref 3.5–5.2)
ALK PHOSPHATASE: 56 U/L (ref 40–123)
ALT SERPL-CCNC: 16 U/L (ref 5–50)
ANION GAP SERPL CALCULATED.3IONS-SCNC: 13 MEQ/L (ref 7–16)
AST SERPL-CCNC: 21 U/L (ref 9–50)
AVERAGE GLUCOSE: 148 MG/DL
BILIRUB SERPL-MCNC: 0.8 MG/DL
BUN BLDV-MCNC: 23 MG/DL (ref 8–23)
CALCIUM SERPL-MCNC: 9.9 MG/DL (ref 8.5–10.5)
CHLORIDE BLD-SCNC: 98 MEQ/L (ref 95–107)
CHOLESTEROL/HDL RATIO: 3.1 RATIO
CHOLESTEROL: 181 MG/DL
CO2: 28 MEQ/L (ref 19–31)
CREAT SERPL-MCNC: 0.86 MG/DL (ref 0.8–1.4)
EGFR IF NONAFRICAN AMERICAN: 99 ML/MIN/1.73
GLUCOSE: 107 MG/DL (ref 70–99)
HBA1C MFR BLD: 6.8 % (ref 4.2–5.6)
HCT VFR BLD CALC: 43.1 % (ref 40–51)
HDLC SERPL-MCNC: 58 MG/DL
HEMOGLOBIN: 14.8 G/DL (ref 13.5–17)
HIV 1,2 COMBO ANTIGEN/ANTIBODY: NORMAL
LDL CHOLESTEROL CALCULATED: 107 MG/DL
LDL/HDL RATIO: 1.8 RATIO
MCH RBC QN AUTO: 28.7 PG (ref 25–33)
MCHC RBC AUTO-ENTMCNC: 34.3 G/DL (ref 31–36)
MCV RBC AUTO: 83.5 FL (ref 80–99)
PDW BLD-RTO: 12.1 % (ref 11.5–15)
PLATELETS: 190 K/UL (ref 130–400)
PMV BLD AUTO: 9.1 FL (ref 9.3–13)
POTASSIUM SERPL-SCNC: 4.4 MEQ/L (ref 3.5–5.4)
PSA, ULTRASENSITIVE: 1.07 NG/ML
RBC: 5.16 M/UL (ref 4.5–6.1)
SODIUM BLD-SCNC: 139 MEQ/L (ref 133–146)
TOTAL PROTEIN: 7 G/DL (ref 6.1–8.3)
TRIGL SERPL-MCNC: 78 MG/DL
VLDLC SERPL CALC-MCNC: 16 MG/DL
WBC: 6.4 K/UL (ref 3.5–11)

## 2023-11-30 ENCOUNTER — OFFICE VISIT (OUTPATIENT)
Dept: PRIMARY CARE CLINIC | Age: 63
End: 2023-11-30

## 2023-11-30 VITALS
HEART RATE: 78 BPM | HEIGHT: 75 IN | OXYGEN SATURATION: 99 % | RESPIRATION RATE: 18 BRPM | WEIGHT: 169.4 LBS | DIASTOLIC BLOOD PRESSURE: 68 MMHG | SYSTOLIC BLOOD PRESSURE: 138 MMHG | TEMPERATURE: 98.2 F | BODY MASS INDEX: 21.06 KG/M2

## 2023-11-30 DIAGNOSIS — Z23 NEED FOR INFLUENZA VACCINATION: ICD-10-CM

## 2023-11-30 DIAGNOSIS — B35.4 TINEA CORPORIS: ICD-10-CM

## 2023-11-30 DIAGNOSIS — Z00.00 WELLNESS EXAMINATION: ICD-10-CM

## 2023-11-30 DIAGNOSIS — Z23 NEED FOR PNEUMOCOCCAL 20-VALENT CONJUGATE VACCINATION: ICD-10-CM

## 2023-11-30 DIAGNOSIS — E11.9 TYPE 2 DIABETES MELLITUS WITHOUT COMPLICATION, UNSPECIFIED WHETHER LONG TERM INSULIN USE (HCC): Primary | ICD-10-CM

## 2023-11-30 DIAGNOSIS — I10 ESSENTIAL HYPERTENSION: ICD-10-CM

## 2023-11-30 RX ORDER — TERBINAFINE HYDROCHLORIDE 250 MG/1
250 TABLET ORAL DAILY
Qty: 10 TABLET | Refills: 0 | Status: SHIPPED | OUTPATIENT
Start: 2023-11-30 | End: 2023-12-10

## 2023-11-30 NOTE — PROGRESS NOTES
Name: Baron Kenney  : 1960         Chief Complaint:     Chief Complaint   Patient presents with    Hypertension     Patient here for follow-up of elevated blood pressure. He is exercising and is adherent to low salt diet. Blood pressure is well controlled at home. Cardiac symptoms none. Patient denies exertional chest pressure/discomfort, fatigue, and irregular heart beat. Cardiovascular risk factors: advanced age (older than 54 for men, 72 for women) and diabetes mellitus. Use of agents associated with hypertension: none. History of target organ damage: none     Diabetes     Diabetic diet/low carb diet compliance:  compliant   Current exercise: walking   exercise: 7 x/week  glucose testing at home: every other week  blood sugar records: average 135-140  Glucometer at home: yes  History of hypoglycemic episodes: no  Last eye exam: 6 months ago  Last diabetic foot check: 23   reports that he has never smoked. He has never used smokeless tobacco.   Medication compliance:  compliant all of the time  Medication Therapy: metformin 1000mg with breakfast and 500mg with dinner        History of Present Illness:      Baron Kenney is a 66 y.o.  male who presents with Hypertension (Patient here for follow-up of elevated blood pressure. He is exercising and is adherent to low salt diet. Blood pressure is well controlled at home. Cardiac symptoms none. Patient denies exertional chest pressure/discomfort, fatigue, and irregular heart beat. Cardiovascular risk factors: advanced age (older than 54 for men, 72 for women) and diabetes mellitus. Use of agents associated with hypertension: none.  History of target organ damage: none ) and Diabetes (Diabetic diet/low carb diet compliance:  compliant /Current exercise: walking/ exercise: 7 x/week/glucose testing at home: every other week/blood sugar records: average 135-140/Glucometer at home: yes/History of hypoglycemic episodes: no/Last eye exam: 6 months ago/Last diabetic

## 2023-11-30 NOTE — PATIENT INSTRUCTIONS
SURVEY:    You may be receiving a survey from [employer] regarding your visit today. Please complete the survey to enable us to provide the highest quality of care to you and your family. If you cannot score us a very good on any question, please call the office to discuss how we could of made your experience a very good one. Thank you.

## 2023-12-08 ENCOUNTER — OFFICE VISIT (OUTPATIENT)
Dept: PRIMARY CARE CLINIC | Age: 63
End: 2023-12-08
Payer: COMMERCIAL

## 2023-12-08 VITALS
WEIGHT: 171 LBS | DIASTOLIC BLOOD PRESSURE: 60 MMHG | HEIGHT: 75 IN | TEMPERATURE: 97.9 F | SYSTOLIC BLOOD PRESSURE: 140 MMHG | BODY MASS INDEX: 21.26 KG/M2 | OXYGEN SATURATION: 98 % | HEART RATE: 84 BPM

## 2023-12-08 DIAGNOSIS — R21 RASH: Primary | ICD-10-CM

## 2023-12-08 PROCEDURE — 3077F SYST BP >= 140 MM HG: CPT | Performed by: NURSE PRACTITIONER

## 2023-12-08 PROCEDURE — 3017F COLORECTAL CA SCREEN DOC REV: CPT | Performed by: NURSE PRACTITIONER

## 2023-12-08 PROCEDURE — 99213 OFFICE O/P EST LOW 20 MIN: CPT | Performed by: NURSE PRACTITIONER

## 2023-12-08 PROCEDURE — G8427 DOCREV CUR MEDS BY ELIG CLIN: HCPCS | Performed by: NURSE PRACTITIONER

## 2023-12-08 PROCEDURE — 1036F TOBACCO NON-USER: CPT | Performed by: NURSE PRACTITIONER

## 2023-12-08 PROCEDURE — 3078F DIAST BP <80 MM HG: CPT | Performed by: NURSE PRACTITIONER

## 2023-12-08 PROCEDURE — G8420 CALC BMI NORM PARAMETERS: HCPCS | Performed by: NURSE PRACTITIONER

## 2023-12-08 PROCEDURE — G8482 FLU IMMUNIZE ORDER/ADMIN: HCPCS | Performed by: NURSE PRACTITIONER

## 2023-12-08 RX ORDER — CLOTRIMAZOLE AND BETAMETHASONE DIPROPIONATE 10; .64 MG/G; MG/G
CREAM TOPICAL
Qty: 1 EACH | Refills: 0 | Status: SHIPPED | OUTPATIENT
Start: 2023-12-08

## 2023-12-08 NOTE — PROGRESS NOTES
Name: Layne Thapa  : 1960         Chief Complaint:     Chief Complaint   Patient presents with    Skin Problem     -10 day skin check  -does feel its better or worse       History of Present Illness:      Layne Thapa is a 66 y.o.  male who presents with Skin Problem (-10 day skin check/-does feel its better or worse)      HPI    Patient was seen in office 2023 when he was noted to have bilateral rash to ankles/shins. He was treated with terbinafine 250 mg daily x 10 days for suspected tinea corporis. Today, he states his rash is still present. He is using benadryl topical. He has 1-2 tablets of terbinafine left. Denies fever or chills. Denies pain to the rash. He is scheduled with dermatology in 2024. Past Medical History:     Past Medical History:   Diagnosis Date    Congenital absence of gallbladder     diagnosed by [facility] in [address] hypertension     Non-insulin dependent type 2 diabetes mellitus ([address] [facility])     NPDR (nonproliferative diabetic retinopathy) ([address] [facility])     Dr Gonzalez/ Dr Jarvis Sanders      Reviewed all health maintenance requirements and ordered appropriate tests  Health Maintenance Due   Topic Date Due    Shingles vaccine (1 of 2) Never done    Hepatitis B vaccine (1 of 3 - Risk 3-dose series) Never done    Respiratory Syncytial Virus (RSV) age 61 yrs+ (3 - 1-dose 60+ series) Never done    Diabetic Alb to Cr ratio (uACR) test  2023    COVID-19 Vaccine ( season) 2023       Past Surgical History:     Past Surgical History:   Procedure Laterality Date    CHOLECYSTECTOMY, LAPAROSCOPIC      COLONOSCOPY  ? pre-cancerous polyp removed, was told to repeat in 5 years    COLONOSCOPY N/A 2019    -hemorrhoids        Medications:       Prior to Admission medications    Medication Sig Start Date End Date Taking?  Authorizing Provider   clotrimazole-betamethasone (LOTRISONE) 1-0.05 % cream Apply topically 2 times daily to affected area 23

## 2024-05-23 LAB
ANION GAP SERPL CALCULATED.3IONS-SCNC: 10 MEQ/L (ref 7–16)
BUN BLDV-MCNC: 25 MG/DL (ref 8–23)
CALCIUM SERPL-MCNC: 10.2 MG/DL (ref 8.5–10.5)
CHLORIDE BLD-SCNC: 100 MEQ/L (ref 95–107)
CHOLESTEROL, TOTAL: 185 MG/DL
CHOLESTEROL/HDL RATIO: 3 RATIO
CO2: 29 MEQ/L (ref 19–31)
CREAT SERPL-MCNC: 0.9 MG/DL (ref 0.8–1.4)
EGFR IF NONAFRICAN AMERICAN: 97 ML/MIN/1.73
ESTIMATED AVERAGE GLUCOSE: 163 MG/DL
GLUCOSE: 137 MG/DL (ref 70–99)
HBA1C MFR BLD: 7.3 % (ref 4.2–5.6)
HCT VFR BLD CALC: 43 % (ref 40–51)
HDLC SERPL-MCNC: 61 MG/DL
HEMOGLOBIN: 14.8 G/DL (ref 13.5–17)
LDL CHOLESTEROL: 106 MG/DL
LDL/HDL RATIO: 1.7 RATIO
MCH RBC QN AUTO: 29.3 PG (ref 25–33)
MCHC RBC AUTO-ENTMCNC: 34.4 G/DL (ref 31–36)
MCV RBC AUTO: 85.1 FL (ref 80–99)
PDW BLD-RTO: 12.3 % (ref 11.5–15)
PLATELET # BLD: 178 K/UL (ref 130–400)
PMV BLD AUTO: 10.1 FL (ref 9.3–13)
POTASSIUM SERPL-SCNC: 4.4 MEQ/L (ref 3.5–5.4)
RBC # BLD: 5.05 M/UL (ref 4.5–6.1)
SODIUM BLD-SCNC: 139 MEQ/L (ref 133–146)
TRIGL SERPL-MCNC: 90 MG/DL
VLDLC SERPL CALC-MCNC: 18 MG/DL
WBC # BLD: 7.3 K/UL (ref 3.5–11)

## 2024-06-04 ENCOUNTER — OFFICE VISIT (OUTPATIENT)
Dept: PRIMARY CARE CLINIC | Age: 64
End: 2024-06-04
Payer: COMMERCIAL

## 2024-06-04 VITALS
TEMPERATURE: 97.7 F | HEART RATE: 85 BPM | SYSTOLIC BLOOD PRESSURE: 128 MMHG | DIASTOLIC BLOOD PRESSURE: 70 MMHG | BODY MASS INDEX: 21.75 KG/M2 | WEIGHT: 174 LBS | OXYGEN SATURATION: 97 %

## 2024-06-04 DIAGNOSIS — E11.9 TYPE 2 DIABETES MELLITUS WITHOUT COMPLICATION, UNSPECIFIED WHETHER LONG TERM INSULIN USE (HCC): Primary | ICD-10-CM

## 2024-06-04 DIAGNOSIS — E78.5 HYPERLIPIDEMIA, UNSPECIFIED HYPERLIPIDEMIA TYPE: ICD-10-CM

## 2024-06-04 DIAGNOSIS — I10 ESSENTIAL HYPERTENSION: ICD-10-CM

## 2024-06-04 PROCEDURE — 3051F HG A1C>EQUAL 7.0%<8.0%: CPT | Performed by: NURSE PRACTITIONER

## 2024-06-04 PROCEDURE — 3074F SYST BP LT 130 MM HG: CPT | Performed by: NURSE PRACTITIONER

## 2024-06-04 PROCEDURE — 1036F TOBACCO NON-USER: CPT | Performed by: NURSE PRACTITIONER

## 2024-06-04 PROCEDURE — 99214 OFFICE O/P EST MOD 30 MIN: CPT | Performed by: NURSE PRACTITIONER

## 2024-06-04 PROCEDURE — 3078F DIAST BP <80 MM HG: CPT | Performed by: NURSE PRACTITIONER

## 2024-06-04 PROCEDURE — 2022F DILAT RTA XM EVC RTNOPTHY: CPT | Performed by: NURSE PRACTITIONER

## 2024-06-04 PROCEDURE — 3017F COLORECTAL CA SCREEN DOC REV: CPT | Performed by: NURSE PRACTITIONER

## 2024-06-04 PROCEDURE — G8427 DOCREV CUR MEDS BY ELIG CLIN: HCPCS | Performed by: NURSE PRACTITIONER

## 2024-06-04 PROCEDURE — G8420 CALC BMI NORM PARAMETERS: HCPCS | Performed by: NURSE PRACTITIONER

## 2024-06-04 SDOH — ECONOMIC STABILITY: FOOD INSECURITY: WITHIN THE PAST 12 MONTHS, YOU WORRIED THAT YOUR FOOD WOULD RUN OUT BEFORE YOU GOT MONEY TO BUY MORE.: NEVER TRUE

## 2024-06-04 SDOH — ECONOMIC STABILITY: FOOD INSECURITY: WITHIN THE PAST 12 MONTHS, THE FOOD YOU BOUGHT JUST DIDN'T LAST AND YOU DIDN'T HAVE MONEY TO GET MORE.: NEVER TRUE

## 2024-06-04 SDOH — ECONOMIC STABILITY: INCOME INSECURITY: HOW HARD IS IT FOR YOU TO PAY FOR THE VERY BASICS LIKE FOOD, HOUSING, MEDICAL CARE, AND HEATING?: NOT HARD AT ALL

## 2024-06-04 ASSESSMENT — PATIENT HEALTH QUESTIONNAIRE - PHQ9
SUM OF ALL RESPONSES TO PHQ QUESTIONS 1-9: 0
1. LITTLE INTEREST OR PLEASURE IN DOING THINGS: NOT AT ALL
SUM OF ALL RESPONSES TO PHQ QUESTIONS 1-9: 0
2. FEELING DOWN, DEPRESSED OR HOPELESS: NOT AT ALL
SUM OF ALL RESPONSES TO PHQ QUESTIONS 1-9: 0
SUM OF ALL RESPONSES TO PHQ9 QUESTIONS 1 & 2: 0
SUM OF ALL RESPONSES TO PHQ QUESTIONS 1-9: 0

## 2024-06-04 NOTE — PROGRESS NOTES
Name: Shai Magdaleno  : 1960         Chief Complaint:     Chief Complaint   Patient presents with    Hyperlipidemia      He admits following a well balanced diet. He admits following a low fat, low cholesterol diet. For exercise, He walks 5000 steps per day.        Diabetes     Diabetic diet/low carb diet compliance:  compliant   Current exercise: walking   exercise: 7 x/week  glucose testing at home: once a month at most  blood sugar records: average 130 fasting  Glucometer at home: yes  History of hypoglycemic episodes: no  Last eye exam: 11/10/23  Last diabetic foot check: 23      Other     reports that he has never smoked. He has never used smokeless tobacco.   Medication compliance:  compliant all of the time  Medication Therapy: metformin 1000mg with breakfast and 500mg with dinner   Last A1c 24, 7.3%    Hypertension     Current medication regimen includes lisinopril-hydrochlorothiazide 10-12.5 mg daily. He is not monitoring his blood pressure at home. At-home blood pressure is averaging N/A. He admits following a low-sodium diet. He denies chest pain, shortness of breath, headache, palpitations, lightheadedness, or dizziness.       History of Present Illness:      Shai Magdaleno is a 64 y.o.  male who presents with Hyperlipidemia ( He admits following a well balanced diet. He admits following a low fat, low cholesterol diet. For exercise, He walks 5000 steps per day. / ), Diabetes (Diabetic diet/low carb diet compliance:  compliant /Current exercise: walking/ exercise: 7 x/week/glucose testing at home: once a month at most/blood sugar records: average 130 fasting/Glucometer at home: yes/History of hypoglycemic episodes: no/Last eye exam: 11/10/23/Last diabetic foot check: 23/), Other (reports that he has never smoked. He has never used smokeless tobacco. /Medication compliance:  compliant all of the time/Medication Therapy: metformin 1000mg with breakfast and 500mg with dinner /Last A1c

## 2024-07-12 ENCOUNTER — TELEMEDICINE (OUTPATIENT)
Dept: PRIMARY CARE CLINIC | Age: 64
End: 2024-07-12
Payer: COMMERCIAL

## 2024-07-12 DIAGNOSIS — U07.1 COVID: Primary | ICD-10-CM

## 2024-07-12 PROCEDURE — G8427 DOCREV CUR MEDS BY ELIG CLIN: HCPCS | Performed by: NURSE PRACTITIONER

## 2024-07-12 PROCEDURE — 99213 OFFICE O/P EST LOW 20 MIN: CPT | Performed by: NURSE PRACTITIONER

## 2024-07-12 PROCEDURE — 3017F COLORECTAL CA SCREEN DOC REV: CPT | Performed by: NURSE PRACTITIONER

## 2024-08-09 LAB — DIABETIC RETINOPATHY: POSITIVE

## 2024-10-17 RX ORDER — SIMVASTATIN 20 MG
20 TABLET ORAL NIGHTLY
Qty: 90 TABLET | Refills: 3 | Status: SHIPPED | OUTPATIENT
Start: 2024-10-17

## 2024-10-17 RX ORDER — LISINOPRIL/HYDROCHLOROTHIAZIDE 10-12.5 MG
1 TABLET ORAL DAILY
Qty: 90 TABLET | Refills: 3 | Status: SHIPPED | OUTPATIENT
Start: 2024-10-17

## 2024-12-11 ENCOUNTER — OFFICE VISIT (OUTPATIENT)
Dept: PRIMARY CARE CLINIC | Age: 64
End: 2024-12-11

## 2024-12-11 VITALS
HEART RATE: 73 BPM | WEIGHT: 170 LBS | OXYGEN SATURATION: 96 % | DIASTOLIC BLOOD PRESSURE: 62 MMHG | TEMPERATURE: 96.8 F | SYSTOLIC BLOOD PRESSURE: 130 MMHG | BODY MASS INDEX: 21.25 KG/M2

## 2024-12-11 DIAGNOSIS — Z23 NEED FOR VACCINATION: ICD-10-CM

## 2024-12-11 DIAGNOSIS — E78.5 HYPERLIPIDEMIA, UNSPECIFIED HYPERLIPIDEMIA TYPE: ICD-10-CM

## 2024-12-11 DIAGNOSIS — Z00.00 WELLNESS EXAMINATION: Primary | ICD-10-CM

## 2024-12-11 DIAGNOSIS — Z12.5 PROSTATE CANCER SCREENING: ICD-10-CM

## 2024-12-11 DIAGNOSIS — Z12.11 SCREENING FOR COLORECTAL CANCER: ICD-10-CM

## 2024-12-11 DIAGNOSIS — E11.9 TYPE 2 DIABETES MELLITUS WITHOUT COMPLICATION, UNSPECIFIED WHETHER LONG TERM INSULIN USE (HCC): ICD-10-CM

## 2024-12-11 DIAGNOSIS — I10 ESSENTIAL HYPERTENSION: ICD-10-CM

## 2024-12-11 DIAGNOSIS — Z12.12 SCREENING FOR COLORECTAL CANCER: ICD-10-CM

## 2024-12-11 LAB — HBA1C MFR BLD: 7.7 %

## 2024-12-11 NOTE — PROGRESS NOTES
Name: Shai Magdaleno  : 1960         Chief Complaint:     Chief Complaint   Patient presents with    Annual Exam       History of Present Illness:      Shai Magdaleno is a 64 y.o.  male who presents with Annual Exam      HPI    Wellness:  He admits well balanced diet.  For exercise he notes walking daily, \"last couple weeks has not been so good\". He admits routine eye exams. He admits routine dental exams. He is vaccinated for covid, most recently 2024. He is UTD tetanus vaccine. He is UTD pneumococcal vaccine. He is not UTD shingles vaccine. He is not UTD influenza vaccine. Most recent colorectal cancer screening 2019 with recommended repeat 5 years. He denies family hx of colorectal cancer. Most recent PSA 2023 1.07. He denies family history of prostate cancer. Smoking status: never.     DM:  Current treatment includes metformin 500mg with breakfast and 1000mg with dinner. UTD eye exam, following with Dr. Esquivel. POC A1c 7.7%. Denies hypoglycemic episodes.     Past Medical History:     Past Medical History:   Diagnosis Date    Congenital absence of gallbladder     diagnosed by [facility] in [place]    Essential hypertension     Non-insulin dependent type 2 diabetes mellitus (HCC)     NPDR (nonproliferative diabetic retinopathy) (HCC)     Dr Gonzalez/ Dr Esquivel      Reviewed all health maintenance requirements and ordered appropriate tests  Health Maintenance Due   Topic Date Due    Shingles vaccine (1 of 2) Never done    Diabetic Alb to Cr ratio (uACR) test  2023    Colorectal Cancer Screen  2024       Past Surgical History:     Past Surgical History:   Procedure Laterality Date    CHOLECYSTECTOMY, LAPAROSCOPIC      COLONOSCOPY  ?    pre-cancerous polyp removed, was told to repeat in 5 years    COLONOSCOPY N/A 2019    -hemorrhoids        Medications:       Prior to Admission medications    Medication Sig Start Date End Date Taking? Authorizing Provider   metFORMIN

## 2024-12-12 ENCOUNTER — TELEPHONE (OUTPATIENT)
Dept: PRIMARY CARE CLINIC | Age: 64
End: 2024-12-12

## 2024-12-12 ENCOUNTER — HOSPITAL ENCOUNTER (EMERGENCY)
Age: 64
Discharge: HOME | End: 2024-12-12
Attending: EMERGENCY MEDICINE
Payer: COMMERCIAL

## 2024-12-12 VITALS
HEART RATE: 111 BPM | DIASTOLIC BLOOD PRESSURE: 86 MMHG | SYSTOLIC BLOOD PRESSURE: 163 MMHG | BODY MASS INDEX: 21.25 KG/M2 | OXYGEN SATURATION: 100 % | RESPIRATION RATE: 18 BRPM | WEIGHT: 170 LBS | TEMPERATURE: 98.1 F

## 2024-12-12 DIAGNOSIS — T18.108A FB ESOPHAGUS, INITIAL ENCOUNTER: Primary | ICD-10-CM

## 2024-12-12 PROCEDURE — 6360000002 HC RX W HCPCS: Performed by: EMERGENCY MEDICINE

## 2024-12-12 PROCEDURE — 99284 EMERGENCY DEPT VISIT MOD MDM: CPT

## 2024-12-12 PROCEDURE — 96375 TX/PRO/DX INJ NEW DRUG ADDON: CPT

## 2024-12-12 PROCEDURE — 96374 THER/PROPH/DIAG INJ IV PUSH: CPT

## 2024-12-12 RX ORDER — GLUCAGON 1 MG/ML
2 KIT INJECTION ONCE
Status: COMPLETED | OUTPATIENT
Start: 2024-12-12 | End: 2024-12-12

## 2024-12-12 RX ORDER — ONDANSETRON 2 MG/ML
4 INJECTION INTRAMUSCULAR; INTRAVENOUS ONCE
Status: COMPLETED | OUTPATIENT
Start: 2024-12-12 | End: 2024-12-12

## 2024-12-12 RX ADMIN — GLUCAGON 2 MG: 1 INJECTION, POWDER, LYOPHILIZED, FOR SOLUTION INTRAMUSCULAR; INTRAVENOUS at 15:03

## 2024-12-12 RX ADMIN — ONDANSETRON 4 MG: 2 INJECTION, SOLUTION INTRAMUSCULAR; INTRAVENOUS at 15:02

## 2024-12-12 NOTE — DISCHARGE INSTRUCTIONS
Continue current medications as prescribed.  Do not eat any meats.  Liquids and soft foods such as soups applesauce yogurt soft noodles etc.  Follow-up with gastroenterology as soon as possible.  You must seek medical attention immediately for any recurrent symptoms or any other acute concern

## 2024-12-12 NOTE — ED PROVIDER NOTES
[facility]  EMERGENCY DEPARTMENT ENCOUNTER      Pt Name: Shai Magdaleno  MRN: [id number]  Birthdate 1960  Date of evaluation: 12/12/2024  Provider: Sandra Colindres MD    CHIEF COMPLAINT       Chief Complaint   Patient presents with    Food bolus in throat     Patient was eating brisket and states it feels like its still stuck in his throat, 1 1/2 hrs PTA. Patient has had issues with swallowing in the past but has always been able to get out bolus.          HISTORY OF PRESENT ILLNESS   (Location/Symptom, Timing/Onset, Context/Setting, Quality, Duration, Modifying Factors, Severity)  Note limiting factors.   Shai Magdaleno is a 64 y.o. male who presents to the emergency department ***     HPI    Nursing Notes were reviewed.    REVIEW OF SYSTEMS    (2-9 systems for level 4, 10 or more for level 5)     Review of Systems    Except as noted above the remainder of the review of systems was reviewed and negative.       PAST MEDICAL HISTORY     Past Medical History:   Diagnosis Date    Congenital absence of gallbladder     diagnosed by [facility] in [place]    Essential hypertension     Non-insulin dependent type 2 diabetes mellitus (HCC)     NPDR (nonproliferative diabetic retinopathy) (HCC) 2021    Dr Gonzalez/ Dr Esquivel         SURGICAL HISTORY       Past Surgical History:   Procedure Laterality Date    CHOLECYSTECTOMY, LAPAROSCOPIC  1990    COLONOSCOPY  2014?    pre-cancerous polyp removed, was told to repeat in 5 years    COLONOSCOPY N/A 8/19/2019    -hemorrhoids         CURRENT MEDICATIONS       Current Discharge Medication List        CONTINUE these medications which have NOT CHANGED    Details   metFORMIN (GLUCOPHAGE) 500 MG tablet TAKE 2 TABLETS BY MOUTH ONCE DAILY WITH BREAKFAST AND 2 TABLETS ONCE DAILY WITH DINNER  Qty: 270 tablet, Refills: 3      simvastatin (ZOCOR) 20 MG tablet Take 1 tablet by mouth nightly  Qty: 90 tablet, Refills: 3      lisinopril-hydroCHLOROthiazide  true   Transportation Needs: Unknown (6/4/2024)    PRAPARE - Transportation     Lack of Transportation (Non-Medical): No   Housing Stability: Unknown (6/4/2024)    Housing Stability Vital Sign     Unstable Housing in the Last Year: No       SCREENINGS        Navi Coma Scale  Eye Opening: Spontaneous  Best Verbal Response: Oriented  Best Motor Response: Obeys commands  [place] Coma Scale Score: 15               PHYSICAL EXAM    (up to 7 for level 4, 8 or more for level 5)     ED Triage Vitals [12/12/24 1333]   BP Systolic BP Percentile Diastolic BP Percentile Temp Temp Source Pulse Respirations SpO2   (!) 163/86 -- -- 98.1 °F (36.7 °C) Oral (!) 111 18 100 %      Height Weight - Scale         -- 77.1 kg (170 lb)             Physical Exam  Vitals and nursing note reviewed.   Constitutional:       Comments: Occasionally spitting clear saliva into basin.  Resting comfortably and in no acute distress   HENT:      Head: Normocephalic and atraumatic.   Cardiovascular:      Rate and Rhythm: Normal rate and regular rhythm.   Pulmonary:      Effort: Pulmonary effort is normal. No respiratory distress.   Abdominal:      General: There is no distension.      Palpations: Abdomen is soft.      Tenderness: There is no abdominal tenderness.   Neurological:      General: No focal deficit present.      Mental Status: He is alert and oriented to person, place, and time.         DIAGNOSTIC RESULTS     EKG: All EKG's are interpreted by the Emergency Department Physician who either signs or Co-signs this chart in the absence of a cardiologist.        RADIOLOGY:   Non-plain film images such as CT, Ultrasound and MRI are read by the radiologist. Plain radiographic images are visualized and preliminarily interpreted by the emergency physician with the below findings:        Interpretation per the Radiologist below, if available at the time of this note:    No orders to display         ED BEDSIDE ULTRASOUND:   Performed by ED Physician -

## 2024-12-12 NOTE — TELEPHONE ENCOUNTER
[facility] ED Follow up Call    Reason for ED visit:  FB esophagus      12/12/2024      NO VM set up

## 2024-12-16 ENCOUNTER — TELEPHONE (OUTPATIENT)
Dept: PRIMARY CARE CLINIC | Age: 64
End: 2024-12-16

## 2024-12-16 NOTE — TELEPHONE ENCOUNTER
[facility] ED Follow up Call    Reason for ED visit:  FB esophagus     12/16/2024     Alexandro Gibbons , this is Cha from Angella Dhillon's office, just calling to see how you are doing after your recent ED visit.    Did you receive discharge instructions?  Yes  Do you understand the discharge instructions? Yes  Did the ED give you any new prescriptions? No:   Were you able to fill your prescriptions? No: NA      Do you have one of our red, yellow and green  Zone sheets that help you to determine when you should go to the ED?  Not Applicable      Do you need or want to make a follow up appt with your PCP?  No    Do you have any further needs in the home, e.g. equipment?  Not Applicable        FU appts/Provider:    Future Appointments   Date Time Provider Department Center   6/11/2025  8:20 AM Angella Rosenberg, APRN - CNP [facility] DEP

## 2025-01-22 NOTE — TELEPHONE ENCOUNTER
Patient called requesting refill on Metformin. Patient will be running out in a week. Has [facility] and they do not have an updated order with patient taking 2 tablets twice daily. Order pended and pharmacy verified.

## 2025-06-17 ENCOUNTER — OFFICE VISIT (OUTPATIENT)
Dept: PRIMARY CARE CLINIC | Age: 65
End: 2025-06-17
Payer: COMMERCIAL

## 2025-06-17 VITALS
OXYGEN SATURATION: 98 % | BODY MASS INDEX: 21.25 KG/M2 | DIASTOLIC BLOOD PRESSURE: 70 MMHG | TEMPERATURE: 96.8 F | WEIGHT: 170 LBS | HEART RATE: 83 BPM | SYSTOLIC BLOOD PRESSURE: 138 MMHG

## 2025-06-17 DIAGNOSIS — E11.9 TYPE 2 DIABETES MELLITUS WITHOUT COMPLICATION, UNSPECIFIED WHETHER LONG TERM INSULIN USE (HCC): ICD-10-CM

## 2025-06-17 DIAGNOSIS — K20.90 ESOPHAGITIS ON BIOPSY: ICD-10-CM

## 2025-06-17 DIAGNOSIS — I10 ESSENTIAL HYPERTENSION: Primary | ICD-10-CM

## 2025-06-17 LAB
ALT SERPL-CCNC: 14 U/L (ref 5–41)
ANION GAP SERPL CALCULATED.3IONS-SCNC: 11 MMOL/L (ref 7–16)
AST SERPL-CCNC: 15 U/L (ref 9–50)
BUN BLDV-MCNC: 26 MG/DL (ref 8–23)
CALCIUM SERPL-MCNC: 10 MG/DL (ref 8.6–10.5)
CHLORIDE BLD-SCNC: 100 MMOL/L (ref 96–107)
CHOLESTEROL, TOTAL: 161 MG/DL (ref 100–199)
CHOLESTEROL/HDL RATIO: 2.6 (ref 2–4.5)
CO2: 32 MMOL/L (ref 18–32)
CREAT SERPL-MCNC: 0.86 MG/DL (ref 0.67–1.3)
EGFR IF NONAFRICAN AMERICAN: 97 ML/MIN/1.73M2
ESTIMATED AVERAGE GLUCOSE: 171 MG/DL
GLUCOSE: 126 MG/DL (ref 70–100)
HBA1C MFR BLD: 7.6 %
HDLC SERPL-MCNC: 61 MG/DL
LDL CHOLESTEROL: 89 MG/DL
LDL/HDL RATIO: 1.5
POTASSIUM SERPL-SCNC: 4.1 MMOL/L (ref 3.5–5.4)
SODIUM BLD-SCNC: 143 MMOL/L (ref 135–148)
TRIGL SERPL-MCNC: 57 MG/DL (ref 20–149)
VITAMIN D 25-HYDROXY: 79.2 NG/ML (ref 30–100)
VLDLC SERPL CALC-MCNC: 11 MG/DL

## 2025-06-17 PROCEDURE — G8427 DOCREV CUR MEDS BY ELIG CLIN: HCPCS | Performed by: NURSE PRACTITIONER

## 2025-06-17 PROCEDURE — 3017F COLORECTAL CA SCREEN DOC REV: CPT | Performed by: NURSE PRACTITIONER

## 2025-06-17 PROCEDURE — 2022F DILAT RTA XM EVC RTNOPTHY: CPT | Performed by: NURSE PRACTITIONER

## 2025-06-17 PROCEDURE — 3078F DIAST BP <80 MM HG: CPT | Performed by: NURSE PRACTITIONER

## 2025-06-17 PROCEDURE — 1036F TOBACCO NON-USER: CPT | Performed by: NURSE PRACTITIONER

## 2025-06-17 PROCEDURE — 99214 OFFICE O/P EST MOD 30 MIN: CPT | Performed by: NURSE PRACTITIONER

## 2025-06-17 PROCEDURE — 3075F SYST BP GE 130 - 139MM HG: CPT | Performed by: NURSE PRACTITIONER

## 2025-06-17 PROCEDURE — G8420 CALC BMI NORM PARAMETERS: HCPCS | Performed by: NURSE PRACTITIONER

## 2025-06-17 PROCEDURE — 3046F HEMOGLOBIN A1C LEVEL >9.0%: CPT | Performed by: NURSE PRACTITIONER

## 2025-06-17 PROCEDURE — G2211 COMPLEX E/M VISIT ADD ON: HCPCS | Performed by: NURSE PRACTITIONER

## 2025-06-17 RX ORDER — CALCIUM POLYCARBOPHIL 625 MG
TABLET ORAL
COMMUNITY

## 2025-06-17 RX ORDER — BUDESONIDE 2 MG/10ML
SUSPENSION ORAL
COMMUNITY
Start: 2025-05-29

## 2025-06-17 RX ORDER — DUPILUMAB 300 MG/2ML
INJECTION, SOLUTION SUBCUTANEOUS
COMMUNITY
Start: 2025-06-05

## 2025-06-17 RX ORDER — OMEPRAZOLE 40 MG/1
CAPSULE, DELAYED RELEASE ORAL DAILY
COMMUNITY
Start: 2025-05-28

## 2025-06-17 SDOH — ECONOMIC STABILITY: FOOD INSECURITY: WITHIN THE PAST 12 MONTHS, YOU WORRIED THAT YOUR FOOD WOULD RUN OUT BEFORE YOU GOT MONEY TO BUY MORE.: NEVER TRUE

## 2025-06-17 SDOH — ECONOMIC STABILITY: FOOD INSECURITY: WITHIN THE PAST 12 MONTHS, THE FOOD YOU BOUGHT JUST DIDN'T LAST AND YOU DIDN'T HAVE MONEY TO GET MORE.: NEVER TRUE

## 2025-06-17 ASSESSMENT — PATIENT HEALTH QUESTIONNAIRE - PHQ9
SUM OF ALL RESPONSES TO PHQ QUESTIONS 1-9: 0
2. FEELING DOWN, DEPRESSED OR HOPELESS: NOT AT ALL
SUM OF ALL RESPONSES TO PHQ QUESTIONS 1-9: 0
SUM OF ALL RESPONSES TO PHQ QUESTIONS 1-9: 0
1. LITTLE INTEREST OR PLEASURE IN DOING THINGS: NOT AT ALL
SUM OF ALL RESPONSES TO PHQ QUESTIONS 1-9: 0

## 2025-06-18 ENCOUNTER — RESULTS FOLLOW-UP (OUTPATIENT)
Dept: PRIMARY CARE CLINIC | Age: 65
End: 2025-06-18

## 2025-06-18 DIAGNOSIS — E11.9 TYPE 2 DIABETES MELLITUS WITHOUT COMPLICATION, UNSPECIFIED WHETHER LONG TERM INSULIN USE (HCC): Primary | ICD-10-CM

## 2025-06-18 LAB
HCT VFR BLD CALC: 41.9 % (ref 39–52)
HEMOGLOBIN: 14.1 G/DL (ref 13–18)
MCH RBC QN AUTO: 30.1 PG (ref 26–32)
MCHC RBC AUTO-ENTMCNC: 33.7 G/DL (ref 32–35)
MCV RBC AUTO: 90 FL (ref 75–100)
PDW BLD-RTO: 12.3 % (ref 11.2–14.8)
PLATELET # BLD: 202 THOUS/CMM (ref 140–440)
RBC # BLD: 4.68 MILL/CMM (ref 4.4–6.1)
WBC # BLD: 8.9 THDS/CMM (ref 3.6–11)

## (undated) DEVICE — CANNULA ORAL NSL AD CO2 N INTUB O2 DEL DISP TRU LNK

## (undated) DEVICE — MEDI-VAC NON-CONDUCTIVE TUBING7MM X 30.5 (100FT): Brand: CARDINAL HEALTH

## (undated) DEVICE — SOLUTION IV IRRIG POUR BRL 0.9% SODIUM CHL 2F7124